# Patient Record
Sex: FEMALE | Race: WHITE | NOT HISPANIC OR LATINO | Employment: FULL TIME | URBAN - METROPOLITAN AREA
[De-identification: names, ages, dates, MRNs, and addresses within clinical notes are randomized per-mention and may not be internally consistent; named-entity substitution may affect disease eponyms.]

---

## 2017-01-18 ENCOUNTER — ALLSCRIPTS OFFICE VISIT (OUTPATIENT)
Dept: OTHER | Facility: OTHER | Age: 32
End: 2017-01-18

## 2017-02-08 ENCOUNTER — GENERIC CONVERSION - ENCOUNTER (OUTPATIENT)
Dept: OTHER | Facility: OTHER | Age: 32
End: 2017-02-08

## 2017-06-01 ENCOUNTER — GENERIC CONVERSION - ENCOUNTER (OUTPATIENT)
Dept: OTHER | Facility: OTHER | Age: 32
End: 2017-06-01

## 2017-07-17 ENCOUNTER — ALLSCRIPTS OFFICE VISIT (OUTPATIENT)
Dept: OTHER | Facility: OTHER | Age: 32
End: 2017-07-17

## 2017-07-17 DIAGNOSIS — R50.9 FEVER: ICD-10-CM

## 2017-07-17 DIAGNOSIS — R10.9 ABDOMINAL PAIN: ICD-10-CM

## 2017-07-17 DIAGNOSIS — K59.00 CONSTIPATION: ICD-10-CM

## 2017-08-15 ENCOUNTER — LAB CONVERSION - ENCOUNTER (OUTPATIENT)
Dept: OTHER | Facility: OTHER | Age: 32
End: 2017-08-15

## 2017-08-15 LAB
25(OH)D3 SERPL-MCNC: 48 NG/ML (ref 30–100)
ANCA SCREEN (HISTORICAL): NEGATIVE
ANTI-MYELOPEROXIDASE (HISTORICAL): <1 AI
ANTI-PROTEINASE 3 (HISTORICAL): <1 AI
C-REACTIVE PROTEIN (HISTORICAL): <0.1 MG/DL
LIPASE SERPL-CCNC: 18 U/L (ref 7–60)
S CEREVISIAE AB (IGA) (HISTORICAL): 6.9 U
S CEREVISIAE AB (IGG) (HISTORICAL): 4.1 U
TSH SERPL DL<=0.05 MIU/L-ACNC: 1.38 MIU/L

## 2017-08-17 ENCOUNTER — ALLSCRIPTS OFFICE VISIT (OUTPATIENT)
Dept: OTHER | Facility: OTHER | Age: 32
End: 2017-08-17

## 2017-08-30 ENCOUNTER — GENERIC CONVERSION - ENCOUNTER (OUTPATIENT)
Dept: OTHER | Facility: OTHER | Age: 32
End: 2017-08-30

## 2017-10-10 ENCOUNTER — GENERIC CONVERSION - ENCOUNTER (OUTPATIENT)
Dept: OTHER | Facility: OTHER | Age: 32
End: 2017-10-10

## 2018-01-11 NOTE — PROGRESS NOTES
Assessment    1  Tear of left rotator cuff (840 4) (M75 102)   2  Back pain, chronic (724 5,338 29) (M54 9,G89 29)   3  Family history of Ovarian cancer : Mother, Grandmother   4  Family history of malignant neoplasm of breast (V16 3) (Z80 3) : Grandmother   5  Tobacco abuse (305 1) (Z72 0)    Plan  Tear of left rotator cuff    · 3 - Akbar Lugo DO  (Orthopedic Surgery) Physician Referral  Consult  Status: Hold  For - Scheduling  Requested for: [id number]  are Referring to a non- Preferred Provider : Established Patient  Care Summary provided  : Yes    Discussion/Summary  Discussion Summary:   28 y/o woman with:  1  Left rotator cuff tear: Patient to get surgery next week from Ortho  Patient is a low cardiopulmonary risk for an intermediate risk surgery, and she has good functional capacity  As such, she may proceed to the OR with acceptable risk and without additional testing  2  Low back pain: Discussed supportive care and return parameters  Patient to continue Meloxicam   3  Tobacco abuse: Encouraged cessation, patient declines help  4  h/o Breast and Ovarian CA: Follow up with her GYN, but consider referral in the future to genetic counsellor  Follow-up in 1 month  Counseling Documentation With Imm: total time of encounter was 30 minutes and 20 minutes was spent counseling  Chief Complaint  Chief Complaint Free Text Note Form: NEW PT TO OUR OFFICE:   PT HAVING LT SHOULDER SURGERY FOR ROTATOR CUFF TEAR ALONG WITH TENDON/LIGAMENT TEAR AND SHAVING OF CLAVICLE ON 1/25/16 BY DR [place] @ 5003  Abby  History of Present Illness  HPI: Patient is a 28 y/o woman who presents to establish care at this office  Patient has a h/o left rotator cuff tear and is planned for surgery  Patient admits a h/o tobacco abuse and low back pain, but denies other acute complaints  Patient denies h/o cardiopulmonary disease and admits that she has excellent functional capacity   Patient admits extensive family h/o ovarian and breast CA and has not been genetically tested  Review of Systems  Complete-Female:   Constitutional: No fever, no chills, feels well, no tiredness, no recent weight gain or weight loss  Eyes: No complaints of eye pain, no red eyes, no eyesight problems, no discharge, no dry eyes, no itching of eyes  ENT: no complaints of earache, no loss of hearing, no nose bleeds, no nasal discharge, no sore throat, no hoarseness  Cardiovascular: No complaints of slow heart rate, no fast heart rate, no chest pain, no palpitations, no leg claudication, no lower extremity edema  Respiratory: No complaints of shortness of breath, no wheezing, no cough, no SOB on exertion, no orthopnea, no PND  Gastrointestinal: No complaints of abdominal pain, no constipation, no nausea or vomiting, no diarrhea, no bloody stools  Genitourinary: No complaints of dysuria, no incontinence, no pelvic pain, no dysmenorrhea, no vaginal discharge or bleeding  Musculoskeletal: arthralgias and myalgias, but as noted in HPI  Integumentary: No complaints of skin rash or lesions, no itching, no skin wounds, no breast pain or lump  Neurological: No complaints of headache, no confusion, no convulsions, no numbness, no dizziness or fainting, no tingling, no limb weakness, no difficulty walking  Psychiatric: Not suicidal, no sleep disturbance, no anxiety or depression, no change in personality, no emotional problems  Endocrine: No complaints of proptosis, no hot flashes, no muscle weakness, no deepening of the voice, no feelings of weakness  Hematologic/Lymphatic: No complaints of swollen glands, no swollen glands in the neck, does not bleed easily, does not bruise easily  Active Problems    1  Abdominal pain (789 00) (R10 9)   2  Allergic drug reaction (995 29,E947 9) (T78 40XA)   3  Back pain, chronic (724 5,338 29) (M54 9,G89 29)   4  History of pregnancy (V13 29)   5  Lower back pain (724 2) (M54 5)   6   Post-op pain (338 18) (G89 18)    Past Medical History  Active Problems And Past Medical History Reviewed: The active problems and past medical history were reviewed and updated today  Surgical History    1  History of  Section   2  History of Hysterectomy   3  History of Tubal Ligation  Surgical History Reviewed: The surgical history was reviewed and updated today  Family History    1  Family history of Ovarian cancer    2  Family history of Cancer   3  Family history of    4  Family history of alcoholism (V17 0) (Z81 1)   5  Family history of hepatic cirrhosis (V18 59) (Z83 79)   6  Family history of Throat cancer    7  Family history of malignant neoplasm of breast (V16 3) (Z80 3)   8  Family history of Ovarian cancer    9  Family history of diabetes mellitus (V18 0) (Z83 3)  Family History Reviewed: The family history was reviewed and updated today  Social History    · Current every day smoker (305 1) (F17 200)   · Current every day smoker (305 1) (F17 200)   ·    ·    · No alcohol use   · No alcohol use   · Two children  Social History Reviewed: The social history was reviewed and updated today  The social history was reviewed and is unchanged  Current Meds   1  Meloxicam 7 5 MG Oral Tablet; TAKE 1 TABLET Twice daily with food; Therapy: 23JHE0861 to (Evaluate:53Sst5153)  Requested for: 37FGU1241; Last   Rx:33Kne5649 Ordered    Allergies    1  No Known Drug Allergies    Vitals  Vital Signs [Data Includes: Current Encounter]    Recorded: 55GRM3738 11:15AM   Blood Pressure 70 mm Hg 120 mm Hg   Height 5 ft 3 in    Weight 134 lb 4 oz    BMI Calculated 23 78 kg/m2    BSA Calculated 1 63 m2      Physical Exam    Constitutional   General appearance: No acute distress, well appearing and well nourished  Eyes   Conjunctiva and lids: No swelling, erythema or discharge  Pupils and irises: Equal, round and reactive to light      Ears, Nose, Mouth, and Throat External inspection of ears and nose: Normal     Otoscopic examination: Tympanic membranes translucent with normal light reflex  Canals patent without erythema  Oropharynx: Normal with no erythema, edema, exudate or lesions  Pulmonary   Respiratory effort: No increased work of breathing or signs of respiratory distress  Auscultation of lungs: Clear to auscultation  Cardiovascular   Auscultation of heart: Normal rate and rhythm, normal S1 and S2, without murmurs  Examination of extremities for edema and/or varicosities: Normal     Abdomen   Abdomen: Abnormal   Abdomen soft, non-distended  Scant epigastric and suprapubic tenderness, no rebound or guarding  Liver and spleen: No hepatomegaly or splenomegaly  Lymphatic   Palpation of lymph nodes in neck: No lymphadenopathy  Musculoskeletal   Gait and station: Normal     Digits and nails: Normal without clubbing or cyanosis  Skin   Skin and subcutaneous tissue: Normal without rashes or lesions  Neurologic   Cranial nerves: Cranial nerves 2-12 intact  Sensation: No sensory loss      Psychiatric   Orientation to person, place, and time: Normal     Mood and affect: Normal          Signatures   Electronically signed by : TREVOR Horton ; Jan 22 2016 12:06PM EST                       (Author)

## 2018-01-12 VITALS
HEIGHT: 63 IN | SYSTOLIC BLOOD PRESSURE: 110 MMHG | BODY MASS INDEX: 22.32 KG/M2 | DIASTOLIC BLOOD PRESSURE: 64 MMHG | WEIGHT: 126 LBS

## 2018-01-13 VITALS
DIASTOLIC BLOOD PRESSURE: 68 MMHG | HEIGHT: 63 IN | WEIGHT: 123.5 LBS | BODY MASS INDEX: 21.88 KG/M2 | SYSTOLIC BLOOD PRESSURE: 118 MMHG

## 2018-01-13 VITALS
TEMPERATURE: 98.9 F | HEIGHT: 63 IN | DIASTOLIC BLOOD PRESSURE: 52 MMHG | HEART RATE: 101 BPM | WEIGHT: 127.38 LBS | SYSTOLIC BLOOD PRESSURE: 104 MMHG | OXYGEN SATURATION: 99 % | BODY MASS INDEX: 22.57 KG/M2

## 2018-01-15 NOTE — MISCELLANEOUS
Message  GI Reminder Recall Marton Halsted:   Date: 06/01/2017   Dear Muriel Wilkerson:     Review of our records shows you are due for the following: Follow Up Visit  Please call the following office to schedule your appointment:   [address], [address], [place], [place], [address] [facility] [phone]  We look forward to hearing from you!      Sincerely,     [facility]      Signatures   Electronically signed by : Angie Reynolds, ; Jun 1 2017  5:55PM EST                       (Author)

## 2018-01-15 NOTE — MISCELLANEOUS
Message   Recorded as Task   Date: 12/19/2016 11:54 AM, Created By: Marcos Wolfe   Task Name: Care Coordination   Assigned To: Ary Cochran   Regarding Patient: Jordan Livingston, Status: Active   CommentLindenis Luzma - 19 Dec 2016 11:54 AM     TASK CREATED  Patient unable to continue to wait  Patient is requesting you call her with the results or send the information to her PCP regarding follow  Patient does not wish to drive back to [facility]  Ary Cochran - 19 Dec 2016 5:07 PM     TASK EDITED  I reviewed 12/12/16 C-spine MRI with Dr Georgie Irby -- patient has borderline cerebellar tonsilar ectopia  (Dr Georgie Irby reports this does not necessarily meet Chiari Malformation criteria)  There is normal flow of CSF anterior and posterior to the foramen magnum  No syrinx  As discussed with Dr Georgie Irby neurosurgical intervention is not indicated and no further routine neurosurgical follow up necessary  Patient may follow up with our office on an as needed basis from a neurosurgical standpoint  Recommend patient refrain from manipulation craniocervical junction or activity that could result in whiplash of head/neck  Recommend patient contact our office with neuro changes  I called patient at [phone] and left message on patient's voice mail for patient to call back so can discuss with patient  Danyell Leos - 20 Dec 2016 11:12 AM     TASK EDITED  PT CALLED YOU BACK  PLEASE CALL PT  Kay  - 20 Dec 2016 12:49 PM     TASK EDITED  I called patient (phone# [phone]) and left message on voice mail requesting call back  Ary Cochran - 20 Dec 2016 1:15 PM     TASK EDITED  Patient called back and I spoke with her  I reviewed results of C-spine MRI with patient  I explained to patient that I had reviewed her C-spine MRI with Dr Georgie Irby  and she has borderline cerebellar tonsillar ectopia (does not necessarily meet Chiari Malformation criteria)    I explained to patient there is normal CSF flow anterior / posterior to foramen magnum and no syrinx  I explained to patient as I  discussed with Dr Cheryl Mahajan that neurosurgical intervention is not indicated and no further routine neurosurgical follow up necessary  Patient is released to follow up with our office on an as needed basis  Patient advised to refrain from manipulation of craniocervical junction or activity that could result in whiplash of head/neck  Advised patient to contact our office with neurological change such as weakness, sensory change, motor change, difficulty with coordination, gait dysfunction, or other neurological change  Patient expressed understanding and agreement          Signatures   Electronically signed by : Iman Cortez, [facility]; Dec 20 2016  1:16PM EST                       (Author)

## 2018-01-18 NOTE — MISCELLANEOUS
Message  GI Reminder Recall Hailey Vernon:   Date: 12/29/2016   Dear Ryanne Duran:     Review of our records shows you are due for the following: Follow Up Visit  Please call the following office to schedule your appointment:   [address] [place], [address], [address] [facility] [phone]  We look forward to hearing from you! Sincerely,         Signatures  [facility]    Electronically signed by :  Marce King, ; Dec 29 2016  4:24PM EST                       (Author)

## 2018-01-22 VITALS
BODY MASS INDEX: 23.04 KG/M2 | SYSTOLIC BLOOD PRESSURE: 100 MMHG | WEIGHT: 130 LBS | HEIGHT: 63 IN | TEMPERATURE: 98.7 F | DIASTOLIC BLOOD PRESSURE: 64 MMHG

## 2018-01-22 VITALS — WEIGHT: 128 LBS | SYSTOLIC BLOOD PRESSURE: 120 MMHG | BODY MASS INDEX: 22.67 KG/M2 | DIASTOLIC BLOOD PRESSURE: 66 MMHG

## 2018-03-12 RX ORDER — ACETAMINOPHEN, ASPIRIN AND CAFFEINE 250; 250; 65 MG/1; MG/1; MG/1
TABLET, FILM COATED ORAL
COMMUNITY
End: 2018-04-20

## 2018-03-13 ENCOUNTER — OFFICE VISIT (OUTPATIENT)
Dept: FAMILY MEDICINE CLINIC | Facility: CLINIC | Age: 33
End: 2018-03-13
Payer: COMMERCIAL

## 2018-03-13 VITALS
BODY MASS INDEX: 31.98 KG/M2 | HEIGHT: 55 IN | WEIGHT: 138.2 LBS | SYSTOLIC BLOOD PRESSURE: 100 MMHG | DIASTOLIC BLOOD PRESSURE: 60 MMHG

## 2018-03-13 DIAGNOSIS — G43.109 MIGRAINE WITH AURA AND WITHOUT STATUS MIGRAINOSUS, NOT INTRACTABLE: Primary | ICD-10-CM

## 2018-03-13 DIAGNOSIS — G56.02 LEFT CARPAL TUNNEL SYNDROME: ICD-10-CM

## 2018-03-13 DIAGNOSIS — R55 VASOVAGAL SYNCOPE: ICD-10-CM

## 2018-03-13 DIAGNOSIS — G93.5 CHIARI MALFORMATION TYPE I (HCC): ICD-10-CM

## 2018-03-13 DIAGNOSIS — F41.9 ANXIETY: ICD-10-CM

## 2018-03-13 PROCEDURE — 99214 OFFICE O/P EST MOD 30 MIN: CPT | Performed by: FAMILY MEDICINE

## 2018-03-13 RX ORDER — TOPIRAMATE 25 MG/1
25 CAPSULE, COATED PELLETS ORAL 2 TIMES DAILY
Qty: 60 CAPSULE | Refills: 3 | Status: SHIPPED | OUTPATIENT
Start: 2018-03-13 | End: 2018-05-10 | Stop reason: SDUPTHER

## 2018-03-13 NOTE — PROGRESS NOTES
Assessment/Plan:   at patient will see Orthopedics regarding carpal tunnel syndrome and left wrist and elbow pain  Patient will have MRI of the brain and laboratory studies regarding carry malformation and ongoing migraines which are worsening  Patient will start Topamax nightly  No problem-specific Assessment & Plan notes found for this encounter  Diagnoses and all orders for this visit:    Migraine with aura and without status migrainosus, not intractable  -     CBC and differential; Future  -     Comprehensive metabolic panel; Future  -     HEMOGLOBIN A1C W/ EAG ESTIMATION; Future  -     Lipid panel; Future  -     Microalbumin / creatinine urine ratio  -     TSH, 3rd generation with T4 reflex; Future  -     Magnesium; Future  -     C-reactive protein; Future  -     Lipid panel; Future  -     Vitamin B12; Future  -     MRI brain w wo contrast; Future  -     topiramate (TOPAMAX) 25 mg sprinkle capsule; Take 1 capsule (25 mg total) by mouth 2 (two) times a day    Vasovagal syncope  -     CBC and differential; Future  -     Comprehensive metabolic panel; Future  -     HEMOGLOBIN A1C W/ EAG ESTIMATION; Future  -     Lipid panel; Future  -     Microalbumin / creatinine urine ratio  -     TSH, 3rd generation with T4 reflex; Future  -     Magnesium; Future  -     C-reactive protein; Future  -     Lipid panel; Future  -     Vitamin B12; Future    Left carpal tunnel syndrome  -     Ambulatory referral to Orthopedic Surgery; Future    Chiari malformation type I ([facility] [address] )  -     CBC and differential; Future  -     Comprehensive metabolic panel; Future  -     HEMOGLOBIN A1C W/ EAG ESTIMATION; Future  -     Lipid panel; Future  -     Microalbumin / creatinine urine ratio  -     TSH, 3rd generation with T4 reflex; Future  -     Magnesium; Future  -     C-reactive protein; Future  -     Lipid panel;  Future  -     Vitamin B12; Future  -     MRI brain w wo contrast; Future    Anxiety    Other orders  - aspirin-acetaminophen-caffeine (EXCEDRIN MIGRAINE) [phone] MG per tablet; Take by mouth  -     pentosan polysulfate (ELMIRON) 100 mg capsule; Take by mouth          Subjective:      Patient ID: Yvonne Edgar is a 28 y o  female  Patient is here with worsening headaches over the past few weeks  Patient with history of migraine headaches for decades  Patient does use Excedrin  The patient with left elbow and wrist pain and history of carpal tunnel syndrome on the left  Patient also with history of left shoulder reconstructive surgery and pain associated with this  Patient is also had near-syncope and syncopal episodes  Normal urination and defecation  No chest pain or shortness of breath  Migraine    Associated symptoms include dizziness and numbness  Pertinent negatives include no seizures  The following portions of the patient's history were reviewed and updated as appropriate: allergies, current medications, past family history, past medical history, past social history, past surgical history and problem list     Review of Systems   Constitutional: Negative  HENT: Negative  Eyes: Negative  Respiratory: Negative  Cardiovascular: Negative  Gastrointestinal: Negative  Endocrine: Negative  Genitourinary: Negative  Musculoskeletal: Positive for arthralgias  Skin: Negative  Allergic/Immunologic: Negative  Neurological: Positive for dizziness, syncope, numbness and headaches  Negative for seizures  Hematological: Negative  Psychiatric/Behavioral: The patient is nervous/anxious  Objective:      /60 (BP Location: Left arm, Patient Position: Sitting, Cuff Size: Standard)   Ht 1' 7 2" (0 488 m)   Wt 62 7 kg (138 lb 3 2 oz)    58 kg/m²          Physical Exam   Constitutional: She is oriented to person, place, and time  She appears well-developed and well-nourished  No distress  HENT:   Head: Normocephalic     Right Ear: External ear normal  Left Ear: External ear normal    Mouth/Throat: Oropharynx is clear and moist  No oropharyngeal exudate  Eyes: EOM are normal  Pupils are equal, round, and reactive to light  Right eye exhibits no discharge  Left eye exhibits no discharge  No scleral icterus  Neck: Normal range of motion  Neck supple  No thyromegaly present  Cardiovascular: Normal rate, regular rhythm, normal heart sounds and intact distal pulses  Exam reveals no gallop and no friction rub  No murmur heard  Pulmonary/Chest: Effort normal and breath sounds normal  No respiratory distress  She has no wheezes  She has no rales  She exhibits no tenderness  Abdominal: Soft  Bowel sounds are normal  She exhibits no distension  There is no tenderness  There is no rebound and no guarding  Musculoskeletal: Normal range of motion  She exhibits no edema or tenderness  Lymphadenopathy:     She has no cervical adenopathy  Neurological: She is alert and oriented to person, place, and time  No cranial nerve deficit  She exhibits normal muscle tone  Coordination normal    Skin: Skin is warm and dry  No rash noted  She is not diaphoretic  No erythema  No pallor  Psychiatric: She has a normal mood and affect  Her behavior is normal  Judgment and thought content normal    Nursing note and vitals reviewed

## 2018-03-21 ENCOUNTER — HOSPITAL ENCOUNTER (OUTPATIENT)
Dept: MRI IMAGING | Facility: HOSPITAL | Age: 33
Discharge: HOME | End: 2018-03-21
Payer: COMMERCIAL

## 2018-03-21 DIAGNOSIS — G93.5 CHIARI MALFORMATION TYPE I (HCC): ICD-10-CM

## 2018-03-21 DIAGNOSIS — G43.109 MIGRAINE WITH AURA AND WITHOUT STATUS MIGRAINOSUS, NOT INTRACTABLE: ICD-10-CM

## 2018-03-21 PROCEDURE — A9585 GADOBUTROL INJECTION: HCPCS | Performed by: FAMILY MEDICINE

## 2018-03-21 PROCEDURE — 70553 MRI BRAIN STEM W/O & W/DYE: CPT

## 2018-03-21 RX ADMIN — GADOBUTROL 6 ML: 604.72 INJECTION INTRAVENOUS at 07:42

## 2018-03-23 ENCOUNTER — TELEPHONE (OUTPATIENT)
Dept: FAMILY MEDICINE CLINIC | Facility: CLINIC | Age: 33
End: 2018-03-23

## 2018-03-23 NOTE — TELEPHONE ENCOUNTER
Please call patient  MRI of the brain showed stable, minimal Chiari malformation which very well may not be causing her any symptoms (generally it is congenital)  Overall the MRI did not show any other lesions, signs of stroke, masses, etc   Patient may discuss more fully with Dr Roopa Preciado at her follow-up visit

## 2018-04-20 ENCOUNTER — OFFICE VISIT (OUTPATIENT)
Dept: OCCUPATIONAL THERAPY | Facility: HOSPITAL | Age: 33
End: 2018-04-20
Attending: ORTHOPAEDIC SURGERY
Payer: COMMERCIAL

## 2018-04-20 ENCOUNTER — OFFICE VISIT (OUTPATIENT)
Dept: OBGYN CLINIC | Facility: HOSPITAL | Age: 33
End: 2018-04-20
Payer: COMMERCIAL

## 2018-04-20 VITALS
HEART RATE: 92 BPM | HEIGHT: 62 IN | BODY MASS INDEX: 23.85 KG/M2 | SYSTOLIC BLOOD PRESSURE: 104 MMHG | WEIGHT: 129.6 LBS | DIASTOLIC BLOOD PRESSURE: 69 MMHG

## 2018-04-20 DIAGNOSIS — G56.23 CUBITAL TUNNEL SYNDROME OF BOTH UPPER EXTREMITIES: Primary | ICD-10-CM

## 2018-04-20 DIAGNOSIS — G56.02 LEFT CARPAL TUNNEL SYNDROME: ICD-10-CM

## 2018-04-20 DIAGNOSIS — G56.23 CUBITAL TUNNEL SYNDROME OF BOTH UPPER EXTREMITIES: ICD-10-CM

## 2018-04-20 PROCEDURE — 99203 OFFICE O/P NEW LOW 30 MIN: CPT | Performed by: ORTHOPAEDIC SURGERY

## 2018-04-20 PROCEDURE — G8992 OTHER PT/OT  D/C STATUS: HCPCS | Performed by: OCCUPATIONAL THERAPIST

## 2018-04-20 PROCEDURE — L3702 EO W/O JOINTS CF: HCPCS | Performed by: OCCUPATIONAL THERAPIST

## 2018-04-20 PROCEDURE — G8990 OTHER PT/OT CURRENT STATUS: HCPCS | Performed by: OCCUPATIONAL THERAPIST

## 2018-04-20 PROCEDURE — G8991 OTHER PT/OT GOAL STATUS: HCPCS | Performed by: OCCUPATIONAL THERAPIST

## 2018-04-20 RX ORDER — TRAMADOL HYDROCHLORIDE 50 MG/1
TABLET ORAL
COMMUNITY
Start: 2018-04-08 | End: 2018-04-20

## 2018-04-20 RX ORDER — CYCLOBENZAPRINE HCL 10 MG
TABLET ORAL
COMMUNITY
Start: 2018-04-08 | End: 2018-04-20

## 2018-04-20 NOTE — PROGRESS NOTES
Splint     Diagnosis:   1  Cubital tunnel syndrome of both upper extremities  Ambulatory referral to PT/OT hand therapy     Indication: nerve    Location: B  Supplies: Skin coverage/barrier  Splint type: long arm   Wearing Schedule: Night only  Describe Position: 70     Precautions: Universal (skin contact/breakdown)    Patient or Caregiver expresses understanding of wearing Schedule and Precautions? Yes  Patient or Caregiver able to don/doff orthotic independently? Yes    Written orders provided to patient?  Yes  Orders Obtained: Written  Orders Obtained from: Dr Sincere Rahman

## 2018-04-21 NOTE — PROGRESS NOTES
ASSESSMENT/PLAN:    Diagnoses and all orders for this visit:    Cubital tunnel syndrome of both upper extremities  -     EMG 2 Limb Upper Extremity; Future  -     Ambulatory referral to PT/OT hand therapy; Future    Left carpal tunnel syndrome  -     Ambulatory referral to Orthopedic Surgery    Other orders  -     Discontinue: cyclobenzaprine (FLEXERIL) 10 mg tablet;   -     Discontinue: traMADol (ULTRAM) 50 mg tablet; Assessment:   Bilateral cubital tunnel syndrome and asymptomatic carpal tunnel syndrome    Plan: At this point, patient has cubital tunnel syndrome appears to be most symptomatic  Patient will obtain EMG is of the bilateral upper extremities, nocturnal splinting from therapy as well as nerve gliding exercises  Follow up in 4-6 weeks for repeat clinical evaluation  Patient may require surgical intervention in the form of cubital tunnel releases bilaterally  Follow Up: To Do Next Visit:  Review EMG    General Discussions:     Carpal Tunnel Syndrome: The anatomy and physiology of carpal tunnel syndrome was discussed with the patient today  Increase pressure localized under the transverse carpal ligament can cause pain, numbness, tingling, or dysesthesias within the median nerve distribution as well as feelings of fatigue, clumsiness, or awkwardness  These symptoms typically occur at night and worse in the morning upon waking  Eventually, untreated carpal tunnel syndrome can result in weakness and permanent loss of muscle within the thenar compartment of the hand  Treatment options were discussed with the patient  Conservative treatment includes nocturnal resting splints to keep the nerve in a neutral position, ergonomic changes within the work or home environment, activity modification, and tendon gliding exercises  Steroid injections within the carpal canal can help a majority of patients, however this is often self-limited in a majority of patients    Surgical intervention to divide the transverse carpal ligament typically results in a long-lasting relief of the patient's complaints, with the recurrence rate of less than 1%  Cubital Tunnel Syndrome: The anatomy and physiology of cubital tunnel syndrome were discussed with the patient today in the office  Typically, increased elbow flexion activities decrease blood flow within the intraneural spaces, resulting in a feeling of numbness, tingling, weakness, or clumsiness within the hand and fingers  Occasionally, anatomic structures such as medial elbow osteophytes, the medial head of the triceps, were subluxing ulnar nerve may result in increased pressure or aggravation at the cubital tunnel  Typical signs and symptoms usually include numbness and tingling within the ring and small finger, weakness with , and weakness with pinch  Conservative treatment and includes nocturnal bracing to keep the elbow in a semi-extended position, activity modification, therapy, and avoiding excessive elbow flexion activities  A majority of patients typically respond to conservative treatment over a period of approximately 3-6 months  EMG/NCV testing of the ulnar nerve at the elbow is not as reliable as carpal tunnel syndrome  Surgical intervention in the form of in situ release of the ulnar nerve at the elbow or ulnar nerve transposition may be required in up to 20% of patients  Operative Discussions:         _____________________________________________________  CHIEF COMPLAINT:  Chief Complaint   Patient presents with    Right Hand - Pain, Numbness, Swelling    Left Hand - Pain, Numbness, Swelling         SUBJECTIVE:  Negra Gamez is a 28y o  year old female who presents for evaluation bilateral hand discomfort    She reports that with elbow flexion activities including at night she sleeps, she awakens with medial sided elbow pain left greater than right that radiates down the ulnar side of the hand causing numbness and tingling into the ring and small fingers  Mild-to-moderate discomfort noted  No fevers, chills, constitutional symptoms or previous injuries  PAST MEDICAL HISTORY:  Past Medical History:   Diagnosis Date    Abdominal pain     Back pain, chronic     Constipation     Fatigue     Loss of weight     Nausea with vomiting        PAST SURGICAL HISTORY:  Past Surgical History:   Procedure Laterality Date     SECTION      COLONOSCOPY N/A 2016    Procedure: COLONOSCOPY;  Surgeon: Apolonia Coates MD;  Location: [facility]; Service:     ENDOMETRIAL CRYOABLATION      HERNIA REPAIR      HYSTERECTOMY      SHOULDER SURGERY Left     TUBAL LIGATION         FAMILY HISTORY:  Family History   Problem Relation Age of Onset    No Known Problems Mother     No Known Problems Father        SOCIAL HISTORY:  Social History   Substance Use Topics    Smoking status: Current Every Day Smoker     Packs/day: 0 50     Types: Cigarettes    Smokeless tobacco: Never Used    Alcohol use No       MEDICATIONS:    Current Outpatient Prescriptions:     topiramate (TOPAMAX) 25 mg sprinkle capsule, Take 1 capsule (25 mg total) by mouth 2 (two) times a day, Disp: 60 capsule, Rfl: 3    ALLERGIES:  No Known Allergies    REVIEW OF SYSTEMS:  Pertinent items are noted in HPI  A comprehensive review of systems was negative      LABS:  HgA1c: No results found for: HGBA1C  BMP:   Lab Results   Component Value Date    GLUCOSE 88 10/19/2016    CALCIUM 9 6 2017     2017    K 4 5 2017    CO2 27 2017     2017    BUN 14 2017    CREATININE 0 78 2017       _____________________________________________________  PHYSICAL EXAMINATION:  General: well developed and well nourished, alert, oriented times 3 and appears comfortable  Psychiatric: Normal  HEENT: Trachea Midline, No torticollis  Cardiovascular: No discernable arrhythmia  Pulmonary: No wheezing or stridor  Skin: No masses, erthema, lacerations, fluctation, ulcerations  Neurovascular: Sensation Intact to the Median, Ulnar, Radial Nerve, Motor Intact to the Median, Ulnar, Radial Nerve, Pulses Intact and Positive Tinel's over the bilateral elbows, positive elbow flexion compression test   Weakness intrinsics notice 4+/5  Negative Tinel's over the carpal tunnel with negative adductor pollicis brevis weakness  No evidence of atrophy  Negative Wartenberg sign and no evidence of clawing  MUSCULOSKELETAL EXAMINATION:  Extremities:  Full elbow wrist and finger range of motion   Negative cervical spine examination      _____________________________________________________  STUDIES REVIEWED:  No Studies to review      PROCEDURES PERFORMED:  Procedures  No Procedures performed today

## 2018-05-10 DIAGNOSIS — G43.109 MIGRAINE WITH AURA AND WITHOUT STATUS MIGRAINOSUS, NOT INTRACTABLE: ICD-10-CM

## 2018-05-10 RX ORDER — TOPIRAMATE 25 MG/1
25 CAPSULE, COATED PELLETS ORAL 2 TIMES DAILY
Qty: 180 CAPSULE | Refills: 0 | Status: SHIPPED | OUTPATIENT
Start: 2018-05-10 | End: 2018-08-02 | Stop reason: ALTCHOICE

## 2018-05-10 NOTE — TELEPHONE ENCOUNTER
Patient called today for refill of Topamax 25 mg sprinkle capsule   Pharmacy [facility] [address] [facility]

## 2018-06-08 ENCOUNTER — HOSPITAL ENCOUNTER (OUTPATIENT)
Dept: NEUROLOGY | Facility: CLINIC | Age: 33
Discharge: HOME | End: 2018-06-08
Payer: COMMERCIAL

## 2018-06-08 DIAGNOSIS — G56.23 CUBITAL TUNNEL SYNDROME OF BOTH UPPER EXTREMITIES: ICD-10-CM

## 2018-06-08 PROCEDURE — 95912 NRV CNDJ TEST 11-12 STUDIES: CPT | Performed by: PSYCHIATRY & NEUROLOGY

## 2018-06-08 PROCEDURE — 95885 MUSC TST DONE W/NERV TST LIM: CPT | Performed by: PSYCHIATRY & NEUROLOGY

## 2018-06-12 ENCOUNTER — TELEPHONE (OUTPATIENT)
Dept: OBGYN CLINIC | Facility: HOSPITAL | Age: 33
End: 2018-06-12

## 2018-06-12 NOTE — TELEPHONE ENCOUNTER
Pt has an appt at the end of July but would like someone to call her before then about her emg results

## 2018-06-19 NOTE — TELEPHONE ENCOUNTER
EMG was normal  However, with the disorder at the elbow (cubital tunnel) this sometimes does not show up unless it's on the more severe side  So we know the likelihood of this being severe is low  We will reevaluate her exam at her follow up to see if still consistent with cubital tunnel and assess how her therapy/splinting has been working for her

## 2018-07-25 ENCOUNTER — OFFICE VISIT (OUTPATIENT)
Dept: OBGYN CLINIC | Facility: HOSPITAL | Age: 33
End: 2018-07-25
Payer: COMMERCIAL

## 2018-07-25 VITALS
WEIGHT: 131 LBS | SYSTOLIC BLOOD PRESSURE: 110 MMHG | HEART RATE: 85 BPM | HEIGHT: 62 IN | DIASTOLIC BLOOD PRESSURE: 74 MMHG | BODY MASS INDEX: 24.11 KG/M2

## 2018-07-25 DIAGNOSIS — G56.23 CUBITAL TUNNEL SYNDROME OF BOTH UPPER EXTREMITIES: Primary | ICD-10-CM

## 2018-07-25 PROCEDURE — 99214 OFFICE O/P EST MOD 30 MIN: CPT | Performed by: ORTHOPAEDIC SURGERY

## 2018-07-25 NOTE — H&P
ASSESSMENT/PLAN:    Assessment:   Cubital Tunnel Syndrome  bilateral    Plan:   Cubital Tunnel Release  Left with a 95% success rate  Post op restrictions were discussed with the patient today in regards to her job description as a   When her left side is healed and ready she will proceed with her right side  Follow Up: After Surgery    To Do Next Visit:    and Sutures out    General Discussions:       Operative Discussions:  Cubital Tunnel Release: The anatomy and physiology of cubital tunnel syndrome were discussed with the patient today in the office  Typically, increased elbow flexion activities decrease blood flow within the intraneural spaces, resulting in a feeling of numbness, tingling, weakness, or clumsiness within the hand and fingers  Occasionally, anatomic structures such as medial elbow osteophytes, the medial head of the triceps, were subluxing ulnar nerve may result in increased pressure or aggravation at the cubital tunnel  Typical signs and symptoms usually include numbness and tingling within the ring and small finger, weakness with , and weakness with pinch  Conservative treatment and includes nocturnal bracing to keep the elbow in a semi-extended position, activity modification, therapy, and avoiding excessive elbow flexion activities  A majority of patients typically respond to conservative treatment over a period of approximately 3-6 months  EMG/NCV testing of the ulnar nerve at the elbow is not as reliable as carpal tunnel syndrome  Surgical intervention in the form of in situ release of the ulnar nerve at the elbow or ulnar nerve transposition may be required in up to 20% of patients  The patient has elected to undergo an cubital tunnel release  The possibility of converting to an open procedure, or a subcutaneous or submuscular ulnar nerve transposition depending on the nerve stability was discussed with the patient    Typically, in the postoperative period, light activities are allowed immediately, driving is allowed when narcotic medications have stopped, and the incision may get wet after 5 days  Heavy activities will be allowed after follow up appointment in 1-2 weeks  Anti-inflammatory medications should be held for approximately 5 days postoperative  While the pain within the ring and small finger of the hands generally improves rapidly, the numbness and tingling, as well as the strength, will slowly improve over a period of weeks to months  Total recovery can take up to 18 months from the time of surgery  Numbness and tingling near the incision, or near the medial aspect of the forearm was discussed with the patient  The patient has an understanding of the above mentioned discussion  The risks and benefits of the procedure were explained to the patient, which include, but are not limited to: Bleeding, infection, recurrence, pain, scar, damage to tendons, damage to nerves, and damage to blood vessels, failure to give desired results and complications related to anesthesia   These risks, along with alternative conservative treatment options, and postoperative protocols were voiced back and understood by the patient   All questions were answered to the patient's satisfaction   The patient agrees to comply with a standard postoperative protocol, and is willing to proceed   Education was provided via written and auditory forms   There were no barriers to learning   Written handouts regarding wound care, incision and scar care, and general preoperative information was provided to the patient   Prior to surgery, the patient may be requested to stop all anti-inflammatory medications   Prophylactic aspirin, Plavix, and Coumadin may be allowed to be continued   Medications including vitamin E , ginkgo, and fish oil are requested to be stopped approximately one week prior to surgery   Hypertensive medications and beta blockers, if taken, should be continued  _____________________________________________________  CHIEF COMPLAINT:  Chief Complaint   Patient presents with    Left Hand - Follow-up    Right Hand - Follow-up         SUBJECTIVE:  Isabel Alejo is a 35y o  year old female who presents for follow up regarding Carpal Tunnel Syndrome  bilateral and Cubital Tunnel Syndrome  bilateral   Since last visit, Isabel Alejo has tried nocturnal elbow bracing and therapy for nerve glide exercises without relief  Pt states that her left side is more severe than her right  Today there is Pain  Mild  Intermittant  Aching and Numbness to the bilateral elbow  Radiation: Yes to the  small finger and with the occasionall numbness to all full fingers  Associated symptoms: No Complaints    PAST MEDICAL HISTORY:  Past Medical History:   Diagnosis Date    Abdominal pain     Abnormal MRI, cervical spine     last assessed: 2016    Back pain, chronic     Constipation     Fatigue     Hemorrhoids     resolved: 2017    Loss of weight     Nausea with vomiting     Tear of left rotator cuff     last assessed: 2016       PAST SURGICAL HISTORY:  Past Surgical History:   Procedure Laterality Date     SECTION      x 2    COLONOSCOPY N/A 2016    Procedure: COLONOSCOPY;  Surgeon: Anabel Ornelas MD;  Location: [facility];   Service:     ENDOMETRIAL CRYOABLATION      HERNIA REPAIR      HYSTERECTOMY      SHOULDER SURGERY Left     TUBAL LIGATION         FAMILY HISTORY:  Family History   Problem Relation Age of Onset    Ovarian cancer Mother     Cancer Father     Alcohol abuse Father         alcoholism    Cirrhosis Father         hepatic    Throat cancer Father     Diabetes Maternal Grandfather         melliltus    Breast cancer Other     Ovarian cancer Other        SOCIAL HISTORY:  Social History   Substance Use Topics    Smoking status: Current Every Day Smoker     Packs/day: 0 50     Types: Cigarettes    Smokeless tobacco: Never Used    Alcohol use No       MEDICATIONS:    Current Outpatient Prescriptions:     topiramate (TOPAMAX) 25 mg sprinkle capsule, Take 1 capsule (25 mg total) by mouth 2 (two) times a day, Disp: 180 capsule, Rfl: 0    ALLERGIES:  No Known Allergies    REVIEW OF SYSTEMS:  Pertinent items are noted in HPI      LABS:  HgA1c: No results found for: HGBA1C  BMP:   Lab Results   Component Value Date    GLUCOSE 88 10/19/2016    CALCIUM 9 6 08/09/2017     08/09/2017    K 4 5 08/09/2017    CO2 27 08/09/2017     08/09/2017    BUN 14 08/09/2017    CREATININE 0 78 08/09/2017           _____________________________________________________  PHYSICAL EXAMINATION:  General: well developed and well nourished, alert, oriented times 3 and appears comfortable  Psychiatric: Normal  HEENT: Trachea Midline, No torticollis  Cardiovascular: No discernable arrhythmia  Pulmonary: No wheezing or stridor  Skin: No masses, erthema, lacerations, fluctation, ulcerations  Neurovascular: Pulses Intact    MUSCULOSKELETAL EXAMINATION:  LEFT SIDE:  Cubital Tunnel:  No ulnar nerve subluxation, Positive Elbow Flexion/Compression Test, Positive Scratch Collapse at the elbow and intrinsic 4-/5, fpl 5/5, fdp to small 4-/5, positive tinels at cubital tunnel  RIGHT SIDE:  Cubital Tunnel:  No ulnar nerve subluxation, Positive Elbow Flexion/Compression Test, Positive Scratch Collapse at the elbow and intrinsic 4/5, fpl 5/5, fdp to small 4-/5, positive tinels at the elbow      _____________________________________________________  STUDIES REVIEWED:  EMG: significant drop in conductive velocity       PROCEDURES PERFORMED:  Procedures  No Procedures performed today   Scribe Attestation    I,:   Humberto Bynum am acting as a scribe while in the presence of the attending physician :        I,:   Alcides Long MD personally performed the services described in this documentation    as scribed in my presence :

## 2018-07-25 NOTE — PROGRESS NOTES
ASSESSMENT/PLAN:    Assessment:   Cubital Tunnel Syndrome  bilateral    Plan:   Cubital Tunnel Release  Left with a 95% success rate  Post op restrictions were discussed with the patient today in regards to her job description as a   When her left side is healed and ready she will proceed with her right side  Follow Up: After Surgery    To Do Next Visit:    and Sutures out    General Discussions:       Operative Discussions:  Cubital Tunnel Release: The anatomy and physiology of cubital tunnel syndrome were discussed with the patient today in the office  Typically, increased elbow flexion activities decrease blood flow within the intraneural spaces, resulting in a feeling of numbness, tingling, weakness, or clumsiness within the hand and fingers  Occasionally, anatomic structures such as medial elbow osteophytes, the medial head of the triceps, were subluxing ulnar nerve may result in increased pressure or aggravation at the cubital tunnel  Typical signs and symptoms usually include numbness and tingling within the ring and small finger, weakness with , and weakness with pinch  Conservative treatment and includes nocturnal bracing to keep the elbow in a semi-extended position, activity modification, therapy, and avoiding excessive elbow flexion activities  A majority of patients typically respond to conservative treatment over a period of approximately 3-6 months  EMG/NCV testing of the ulnar nerve at the elbow is not as reliable as carpal tunnel syndrome  Surgical intervention in the form of in situ release of the ulnar nerve at the elbow or ulnar nerve transposition may be required in up to 20% of patients  The patient has elected to undergo an cubital tunnel release  The possibility of converting to an open procedure, or a subcutaneous or submuscular ulnar nerve transposition depending on the nerve stability was discussed with the patient    Typically, in the postoperative period, light activities are allowed immediately, driving is allowed when narcotic medications have stopped, and the incision may get wet after 5 days  Heavy activities will be allowed after follow up appointment in 1-2 weeks  Anti-inflammatory medications should be held for approximately 5 days postoperative  While the pain within the ring and small finger of the hands generally improves rapidly, the numbness and tingling, as well as the strength, will slowly improve over a period of weeks to months  Total recovery can take up to 18 months from the time of surgery  Numbness and tingling near the incision, or near the medial aspect of the forearm was discussed with the patient  The patient has an understanding of the above mentioned discussion  The risks and benefits of the procedure were explained to the patient, which include, but are not limited to: Bleeding, infection, recurrence, pain, scar, damage to tendons, damage to nerves, and damage to blood vessels, failure to give desired results and complications related to anesthesia   These risks, along with alternative conservative treatment options, and postoperative protocols were voiced back and understood by the patient   All questions were answered to the patient's satisfaction   The patient agrees to comply with a standard postoperative protocol, and is willing to proceed   Education was provided via written and auditory forms   There were no barriers to learning   Written handouts regarding wound care, incision and scar care, and general preoperative information was provided to the patient   Prior to surgery, the patient may be requested to stop all anti-inflammatory medications   Prophylactic aspirin, Plavix, and Coumadin may be allowed to be continued   Medications including vitamin E , ginkgo, and fish oil are requested to be stopped approximately one week prior to surgery   Hypertensive medications and beta blockers, if taken, should be continued  _____________________________________________________  CHIEF COMPLAINT:  Chief Complaint   Patient presents with    Left Hand - Follow-up    Right Hand - Follow-up         SUBJECTIVE:  Shelby Jimenez is a 35y o  year old female who presents for follow up regarding Carpal Tunnel Syndrome  bilateral and Cubital Tunnel Syndrome  bilateral   Since last visit, Shelby Jimenez has tried nocturnal elbow bracing and therapy for nerve glide exercises without relief  Pt states that her left side is more severe than her right  Today there is Pain  Mild  Intermittant  Aching and Numbness to the bilateral elbow  Radiation: Yes to the  small finger and with the occasionall numbness to all full fingers  Associated symptoms: No Complaints    PAST MEDICAL HISTORY:  Past Medical History:   Diagnosis Date    Abdominal pain     Abnormal MRI, cervical spine     last assessed: 2016    Back pain, chronic     Constipation     Fatigue     Hemorrhoids     resolved: 2017    Loss of weight     Nausea with vomiting     Tear of left rotator cuff     last assessed: 2016       PAST SURGICAL HISTORY:  Past Surgical History:   Procedure Laterality Date     SECTION      x 2    COLONOSCOPY N/A 2016    Procedure: COLONOSCOPY;  Surgeon: Raheel Wiggins MD;  Location: [facility];   Service:     ENDOMETRIAL CRYOABLATION      HERNIA REPAIR      HYSTERECTOMY      SHOULDER SURGERY Left     TUBAL LIGATION         FAMILY HISTORY:  Family History   Problem Relation Age of Onset    Ovarian cancer Mother     Cancer Father     Alcohol abuse Father         alcoholism    Cirrhosis Father         hepatic    Throat cancer Father     Diabetes Maternal Grandfather         melliltus    Breast cancer Other     Ovarian cancer Other        SOCIAL HISTORY:  Social History   Substance Use Topics    Smoking status: Current Every Day Smoker     Packs/day: 0 50     Types: Cigarettes    Smokeless tobacco: Never Used    Alcohol use No       MEDICATIONS:    Current Outpatient Prescriptions:     topiramate (TOPAMAX) 25 mg sprinkle capsule, Take 1 capsule (25 mg total) by mouth 2 (two) times a day, Disp: 180 capsule, Rfl: 0    ALLERGIES:  No Known Allergies    REVIEW OF SYSTEMS:  Pertinent items are noted in HPI      LABS:  HgA1c: No results found for: HGBA1C  BMP:   Lab Results   Component Value Date    GLUCOSE 88 10/19/2016    CALCIUM 9 6 08/09/2017     08/09/2017    K 4 5 08/09/2017    CO2 27 08/09/2017     08/09/2017    BUN 14 08/09/2017    CREATININE 0 78 08/09/2017           _____________________________________________________  PHYSICAL EXAMINATION:  General: well developed and well nourished, alert, oriented times 3 and appears comfortable  Psychiatric: Normal  HEENT: Trachea Midline, No torticollis  Cardiovascular: No discernable arrhythmia  Pulmonary: No wheezing or stridor  Skin: No masses, erthema, lacerations, fluctation, ulcerations  Neurovascular: Pulses Intact    MUSCULOSKELETAL EXAMINATION:  LEFT SIDE:  Cubital Tunnel:  No ulnar nerve subluxation, Positive Elbow Flexion/Compression Test, Positive Scratch Collapse at the elbow and intrinsic 4-/5, fpl 5/5, fdp to small 4-/5, positive tinels at cubital tunnel  RIGHT SIDE:  Cubital Tunnel:  No ulnar nerve subluxation, Positive Elbow Flexion/Compression Test, Positive Scratch Collapse at the elbow and intrinsic 4/5, fpl 5/5, fdp to small 4-/5, positive tinels at the elbow      _____________________________________________________  STUDIES REVIEWED:  EMG: significant drop in conductive velocity       PROCEDURES PERFORMED:  Procedures  No Procedures performed today   Scribe Attestation    I,:   Nanette Guevara am acting as a scribe while in the presence of the attending physician :        I,:   Chidi Teran MD personally performed the services described in this documentation    as scribed in my presence :

## 2018-08-02 NOTE — PRE-PROCEDURE INSTRUCTIONS
No outpatient prescriptions have been marked as taking for the 8/16/18 encounter [facility] HOSPITAL Encounter)  Pre-op Showering Instructions for Surgery Patients    Before your operation, you play an important role in decreasing your risk for infection by washing with special antiseptic soap  This is an effective way to reduce bacteria on the skin which may help to prevent infections at the surgical site  Please read the following directions in advance  1  In the week before your operation, purchase a 4 ounce bottle of antiseptic soap containing chlorhexidine gluconate (CHG)  4%  Some brand names include: Aplicare®, Endure, and Hibiclens®  The cost is usually less than $5 00   For your convenience, the [employer] carries the soap   It may also be available at your doctors office or pre-admission testing center, and at most retail pharmacies   If you are allergic or sensitive to soaps containing CHG, please let your doctor know so another antiseptic can be suggested   CHG antiseptic soap is for external use only  2  The day before your operation, follow these instructions carefully to get ready   Please clean linens (sheets) on your bed; you should sleep on clean sheets after your evening shower   Get clean towels and washcloth ready - you need enough for 2 showers   Set aside clean underwear, pajamas, and clothing to wear after the showers     Reminders:   DO NOT use any other soap or body rinse on your skin during or after the antiseptic showers   DO NOT use lotion, powder, deodorant, or perfume/aftershave of any kind on your skin after your antiseptic shower   DO NOT shave any body parts in the 24 hours/day before your operation   DO NOT get the antiseptic soap in your eyes, ears, nose, mouth, or vaginal area    3   You will need to shower the night before AND the morning of your surgery  Shower 1:   The first evening before the operation, take the first shower   First, shampoo your hair with regular shampoo and rinse it completely before you use the antiseptic soap  After washing and rinsing your hair, rinse your body   Next, use a clean washcloth to apply the antiseptic soap and wash your body from the neck down to your toes using ½ bottle of the antiseptic soap   You will use the other ½ bottle for the second shower   Clean the area where your incision will be; lather this area well for about 2 minutes   If you are having head or neck surgery, wash areas with the antiseptic soap   Rinse yourself completely with running water   Use a clean towel to dry off   Wear clean underwear and clothing/pajamas  Shower 2   The morning of your operation, take the second shower following the same steps as Shower 1 using the second ½ of the bottle of antiseptic soap   Use clean cloths and towels to wash and dry yourself   Wear clean underwear and clothing

## 2018-08-15 RX ORDER — BUPIVACAINE HYDROCHLORIDE AND EPINEPHRINE 5; 5 MG/ML; UG/ML
30 INJECTION, SOLUTION EPIDURAL; INTRACAUDAL; PERINEURAL ONCE
Status: CANCELLED | OUTPATIENT
Start: 2018-08-16

## 2018-08-16 ENCOUNTER — HOSPITAL ENCOUNTER (OUTPATIENT)
Facility: HOSPITAL | Age: 33
Setting detail: OUTPATIENT SURGERY
Discharge: HOME | End: 2018-08-16
Attending: ORTHOPAEDIC SURGERY | Admitting: ORTHOPAEDIC SURGERY
Payer: COMMERCIAL

## 2018-08-16 ENCOUNTER — ANESTHESIA EVENT (OUTPATIENT)
Dept: PERIOP | Facility: HOSPITAL | Age: 33
End: 2018-08-16
Payer: COMMERCIAL

## 2018-08-16 ENCOUNTER — ANESTHESIA (OUTPATIENT)
Dept: PERIOP | Facility: HOSPITAL | Age: 33
End: 2018-08-16
Payer: COMMERCIAL

## 2018-08-16 VITALS
HEIGHT: 62 IN | BODY MASS INDEX: 22.82 KG/M2 | SYSTOLIC BLOOD PRESSURE: 111 MMHG | HEART RATE: 76 BPM | WEIGHT: 124 LBS | OXYGEN SATURATION: 100 % | DIASTOLIC BLOOD PRESSURE: 65 MMHG | RESPIRATION RATE: 18 BRPM | TEMPERATURE: 97.8 F

## 2018-08-16 DIAGNOSIS — G56.23 CUBITAL TUNNEL SYNDROME OF BOTH UPPER EXTREMITIES: Primary | ICD-10-CM

## 2018-08-16 PROCEDURE — 64718 REVISE ULNAR NERVE AT ELBOW: CPT | Performed by: ORTHOPAEDIC SURGERY

## 2018-08-16 RX ORDER — FENTANYL CITRATE/PF 50 MCG/ML
25 SYRINGE (ML) INJECTION
Status: DISCONTINUED | OUTPATIENT
Start: 2018-08-16 | End: 2018-08-16 | Stop reason: HOSPADM

## 2018-08-16 RX ORDER — FENTANYL CITRATE 50 UG/ML
INJECTION, SOLUTION INTRAMUSCULAR; INTRAVENOUS AS NEEDED
Status: DISCONTINUED | OUTPATIENT
Start: 2018-08-16 | End: 2018-08-16 | Stop reason: SURG

## 2018-08-16 RX ORDER — HYDROCODONE BITARTRATE AND ACETAMINOPHEN 5; 325 MG/1; MG/1
1 TABLET ORAL EVERY 6 HOURS PRN
Status: DISCONTINUED | OUTPATIENT
Start: 2018-08-16 | End: 2018-08-16 | Stop reason: HOSPADM

## 2018-08-16 RX ORDER — PROPOFOL 10 MG/ML
INJECTION, EMULSION INTRAVENOUS AS NEEDED
Status: DISCONTINUED | OUTPATIENT
Start: 2018-08-16 | End: 2018-08-16 | Stop reason: SURG

## 2018-08-16 RX ORDER — SODIUM CHLORIDE, SODIUM LACTATE, POTASSIUM CHLORIDE, CALCIUM CHLORIDE 600; 310; 30; 20 MG/100ML; MG/100ML; MG/100ML; MG/100ML
75 INJECTION, SOLUTION INTRAVENOUS CONTINUOUS
Status: DISCONTINUED | OUTPATIENT
Start: 2018-08-16 | End: 2018-08-16 | Stop reason: HOSPADM

## 2018-08-16 RX ORDER — HYDROCODONE BITARTRATE AND ACETAMINOPHEN 5; 325 MG/1; MG/1
1 TABLET ORAL EVERY 6 HOURS PRN
Qty: 10 TABLET | Refills: 0 | Status: SHIPPED | OUTPATIENT
Start: 2018-08-16 | End: 2018-08-26

## 2018-08-16 RX ORDER — ONDANSETRON 2 MG/ML
INJECTION INTRAMUSCULAR; INTRAVENOUS AS NEEDED
Status: DISCONTINUED | OUTPATIENT
Start: 2018-08-16 | End: 2018-08-16 | Stop reason: SURG

## 2018-08-16 RX ORDER — SENNOSIDES 8.6 MG
650 CAPSULE ORAL EVERY 8 HOURS
Qty: 15 TABLET | Refills: 0 | Status: SHIPPED | OUTPATIENT
Start: 2018-08-16 | End: 2018-09-12 | Stop reason: ALTCHOICE

## 2018-08-16 RX ORDER — LIDOCAINE HYDROCHLORIDE AND EPINEPHRINE 10; 10 MG/ML; UG/ML
INJECTION, SOLUTION INFILTRATION; PERINEURAL AS NEEDED
Status: DISCONTINUED | OUTPATIENT
Start: 2018-08-16 | End: 2018-08-16 | Stop reason: HOSPADM

## 2018-08-16 RX ORDER — MIDAZOLAM HYDROCHLORIDE 1 MG/ML
INJECTION INTRAMUSCULAR; INTRAVENOUS AS NEEDED
Status: DISCONTINUED | OUTPATIENT
Start: 2018-08-16 | End: 2018-08-16 | Stop reason: SURG

## 2018-08-16 RX ORDER — DEXAMETHASONE SODIUM PHOSPHATE 4 MG/ML
INJECTION, SOLUTION INTRA-ARTICULAR; INTRALESIONAL; INTRAMUSCULAR; INTRAVENOUS; SOFT TISSUE AS NEEDED
Status: DISCONTINUED | OUTPATIENT
Start: 2018-08-16 | End: 2018-08-16 | Stop reason: SURG

## 2018-08-16 RX ORDER — NAPROXEN 500 MG/1
500 TABLET ORAL 2 TIMES DAILY WITH MEALS
Qty: 10 TABLET | Refills: 0 | Status: SHIPPED | OUTPATIENT
Start: 2018-08-16 | End: 2018-08-16 | Stop reason: HOSPADM

## 2018-08-16 RX ADMIN — FENTANYL CITRATE 50 MCG: 50 INJECTION, SOLUTION INTRAMUSCULAR; INTRAVENOUS at 07:48

## 2018-08-16 RX ADMIN — PROPOFOL 50 MG: 10 INJECTION, EMULSION INTRAVENOUS at 07:51

## 2018-08-16 RX ADMIN — CEFAZOLIN SODIUM 2000 MG: 2 SOLUTION INTRAVENOUS at 07:55

## 2018-08-16 RX ADMIN — MIDAZOLAM HYDROCHLORIDE 2 MG: 1 INJECTION, SOLUTION INTRAMUSCULAR; INTRAVENOUS at 07:45

## 2018-08-16 RX ADMIN — SODIUM CHLORIDE, SODIUM LACTATE, POTASSIUM CHLORIDE, AND CALCIUM CHLORIDE 75 ML/HR: .6; .31; .03; .02 INJECTION, SOLUTION INTRAVENOUS at 06:23

## 2018-08-16 RX ADMIN — DEXAMETHASONE SODIUM PHOSPHATE 4 MG: 4 INJECTION, SOLUTION INTRAMUSCULAR; INTRAVENOUS at 08:00

## 2018-08-16 RX ADMIN — PROPOFOL 200 MG: 10 INJECTION, EMULSION INTRAVENOUS at 07:50

## 2018-08-16 RX ADMIN — HYDROCODONE BITARTRATE AND ACETAMINOPHEN 1 TABLET: 5; 325 TABLET ORAL at 09:05

## 2018-08-16 RX ADMIN — ONDANSETRON 4 MG: 2 INJECTION INTRAMUSCULAR; INTRAVENOUS at 08:11

## 2018-08-16 RX ADMIN — FENTANYL CITRATE 50 MCG: 50 INJECTION, SOLUTION INTRAMUSCULAR; INTRAVENOUS at 08:35

## 2018-08-16 NOTE — H&P (VIEW-ONLY)
ASSESSMENT/PLAN:    Assessment:   Cubital Tunnel Syndrome  bilateral    Plan:   Cubital Tunnel Release  Left with a 95% success rate  Post op restrictions were discussed with the patient today in regards to her job description as a   When her left side is healed and ready she will proceed with her right side  Follow Up: After Surgery    To Do Next Visit:    and Sutures out    General Discussions:       Operative Discussions:  Cubital Tunnel Release: The anatomy and physiology of cubital tunnel syndrome were discussed with the patient today in the office  Typically, increased elbow flexion activities decrease blood flow within the intraneural spaces, resulting in a feeling of numbness, tingling, weakness, or clumsiness within the hand and fingers  Occasionally, anatomic structures such as medial elbow osteophytes, the medial head of the triceps, were subluxing ulnar nerve may result in increased pressure or aggravation at the cubital tunnel  Typical signs and symptoms usually include numbness and tingling within the ring and small finger, weakness with , and weakness with pinch  Conservative treatment and includes nocturnal bracing to keep the elbow in a semi-extended position, activity modification, therapy, and avoiding excessive elbow flexion activities  A majority of patients typically respond to conservative treatment over a period of approximately 3-6 months  EMG/NCV testing of the ulnar nerve at the elbow is not as reliable as carpal tunnel syndrome  Surgical intervention in the form of in situ release of the ulnar nerve at the elbow or ulnar nerve transposition may be required in up to 20% of patients  The patient has elected to undergo an cubital tunnel release  The possibility of converting to an open procedure, or a subcutaneous or submuscular ulnar nerve transposition depending on the nerve stability was discussed with the patient    Typically, in the postoperative period, light activities are allowed immediately, driving is allowed when narcotic medications have stopped, and the incision may get wet after 5 days  Heavy activities will be allowed after follow up appointment in 1-2 weeks  Anti-inflammatory medications should be held for approximately 5 days postoperative  While the pain within the ring and small finger of the hands generally improves rapidly, the numbness and tingling, as well as the strength, will slowly improve over a period of weeks to months  Total recovery can take up to 18 months from the time of surgery  Numbness and tingling near the incision, or near the medial aspect of the forearm was discussed with the patient  The patient has an understanding of the above mentioned discussion  The risks and benefits of the procedure were explained to the patient, which include, but are not limited to: Bleeding, infection, recurrence, pain, scar, damage to tendons, damage to nerves, and damage to blood vessels, failure to give desired results and complications related to anesthesia   These risks, along with alternative conservative treatment options, and postoperative protocols were voiced back and understood by the patient   All questions were answered to the patient's satisfaction   The patient agrees to comply with a standard postoperative protocol, and is willing to proceed   Education was provided via written and auditory forms   There were no barriers to learning   Written handouts regarding wound care, incision and scar care, and general preoperative information was provided to the patient   Prior to surgery, the patient may be requested to stop all anti-inflammatory medications   Prophylactic aspirin, Plavix, and Coumadin may be allowed to be continued   Medications including vitamin E , ginkgo, and fish oil are requested to be stopped approximately one week prior to surgery   Hypertensive medications and beta blockers, if taken, should be continued  _____________________________________________________  CHIEF COMPLAINT:  Chief Complaint   Patient presents with    Left Hand - Follow-up    Right Hand - Follow-up         SUBJECTIVE:  Hernando Mathur is a 35y o  year old female who presents for follow up regarding Carpal Tunnel Syndrome  bilateral and Cubital Tunnel Syndrome  bilateral   Since last visit, Hernando Mathur has tried nocturnal elbow bracing and therapy for nerve glide exercises without relief  Pt states that her left side is more severe than her right  Today there is Pain  Mild  Intermittant  Aching and Numbness to the bilateral elbow  Radiation: Yes to the  small finger and with the occasionall numbness to all full fingers  Associated symptoms: No Complaints    PAST MEDICAL HISTORY:  Past Medical History:   Diagnosis Date    Abdominal pain     Abnormal MRI, cervical spine     last assessed: 2016    Back pain, chronic     Constipation     Fatigue     Hemorrhoids     resolved: 2017    Loss of weight     Nausea with vomiting     Tear of left rotator cuff     last assessed: 2016       PAST SURGICAL HISTORY:  Past Surgical History:   Procedure Laterality Date     SECTION      x 2    COLONOSCOPY N/A 2016    Procedure: COLONOSCOPY;  Surgeon: Can Wharton MD;  Location: [facility] GI LAB;   Service:     ENDOMETRIAL CRYOABLATION      HERNIA REPAIR      HYSTERECTOMY      SHOULDER SURGERY Left     TUBAL LIGATION         FAMILY HISTORY:  Family History   Problem Relation Age of Onset    Ovarian cancer Mother     Cancer Father     Alcohol abuse Father         alcoholism    Cirrhosis Father         hepatic    Throat cancer Father     Diabetes Maternal Grandfather         melliltus    Breast cancer Other     Ovarian cancer Other        SOCIAL HISTORY:  Social History   Substance Use Topics    Smoking status: Current Every Day Smoker     Packs/day: 0 50     Types: Cigarettes    Smokeless tobacco: Never Used    Alcohol use No       MEDICATIONS:    Current Outpatient Prescriptions:     topiramate (TOPAMAX) 25 mg sprinkle capsule, Take 1 capsule (25 mg total) by mouth 2 (two) times a day, Disp: 180 capsule, Rfl: 0    ALLERGIES:  No Known Allergies    REVIEW OF SYSTEMS:  Pertinent items are noted in HPI      LABS:  HgA1c: No results found for: HGBA1C  BMP:   Lab Results   Component Value Date    GLUCOSE 88 10/19/2016    CALCIUM 9 6 08/09/2017     08/09/2017    K 4 5 08/09/2017    CO2 27 08/09/2017     08/09/2017    BUN 14 08/09/2017    CREATININE 0 78 08/09/2017           _____________________________________________________  PHYSICAL EXAMINATION:  General: well developed and well nourished, alert, oriented times 3 and appears comfortable  Psychiatric: Normal  HEENT: Trachea Midline, No torticollis  Cardiovascular: No discernable arrhythmia  Pulmonary: No wheezing or stridor  Skin: No masses, erthema, lacerations, fluctation, ulcerations  Neurovascular: Pulses Intact    MUSCULOSKELETAL EXAMINATION:  LEFT SIDE:  Cubital Tunnel:  No ulnar nerve subluxation, Positive Elbow Flexion/Compression Test, Positive Scratch Collapse at the elbow and intrinsic 4-/5, fpl 5/5, fdp to small 4-/5, positive tinels at cubital tunnel  RIGHT SIDE:  Cubital Tunnel:  No ulnar nerve subluxation, Positive Elbow Flexion/Compression Test, Positive Scratch Collapse at the elbow and intrinsic 4/5, fpl 5/5, fdp to small 4-/5, positive tinels at the elbow      _____________________________________________________  STUDIES REVIEWED:  EMG: significant drop in conductive velocity       PROCEDURES PERFORMED:  Procedures  No Procedures performed today   Scribe Attestation    I,:   Bibi Hill am acting as a scribe while in the presence of the attending physician :        I,:   Adrian Hillman MD personally performed the services described in this documentation    as scribed in my presence :

## 2018-08-16 NOTE — ANESTHESIA PREPROCEDURE EVALUATION
Review of Systems/Medical History  Patient summary reviewed  Chart reviewed  History of anesthetic complications PONV    Cardiovascular   Pulmonary  Smoker cigarette smoker  , Tobacco cessation counseling given Cumulative Pack Years: 8,        GI/Hepatic            Endo/Other     GYN       Hematology   Musculoskeletal       Neurology    Headaches (migraines),   Comment: Chiari malformation nosymptoms, found incidentally Psychology   Anxiety,              Physical Exam    Airway    Mallampati score: I  TM Distance: >3 FB  Neck ROM: full     Dental   No notable dental hx     Cardiovascular  Cardiovascular exam normal    Pulmonary  Pulmonary exam normal     Other Findings        Anesthesia Plan  ASA Score- 2     Anesthesia Type- general with ASA Monitors  Additional Monitors:   Airway Plan: LMA  Plan Factors-    Induction- intravenous  Postoperative Plan-     Informed Consent- Anesthetic plan and risks discussed with patient

## 2018-08-16 NOTE — DISCHARGE INSTRUCTIONS
Post Operative Instructions    You have had surgery on your arm today, please read and follow the information below:  · Elevate your hand above your elbow during the next 24-48 hours to help with swelling  · Place your hand and arm over your head with motion at your shoulder three times a day  · Do not apply any cream/ointment/oil to your incisions including antibiotics  · Do not soak your hands in standing water (dishwater, tubs, Jacuzzi's, pools, etc ) until given permission (typically 2-3 weeks after injury)    Call the office if you notice any:  · Increased numbness or tingling of your hand or fingers that is not relieved with elevation  · Increasing pain that is not controlled with medication  · Difficulty chewing, breathing, swallowing  · Chest pains or shortness of breath  · Fever over 101 4 degrees  Bandage: Do NOT remove bandage until follow-up appointment  Motion: Move fingers into a fist 5 times a day, DO NOT move any splinted fingers  Weight bearing status: Avoid heavy lifting (>5 pounds) with the extremity that was operated on until follow up appointment  Normal activities of daily living are OK  Ice: Ice for 10 minutes every hour as needed for swelling x 24 hours  Sling: No sling necessary  Pain medication:   Tylenol Extended Release 650 mg every 8 hours  Norco/Hydrocodone one tab every 6 hours AS NEEDED for pain     Follow-up Appointment: 7-10 days  Please call the office if you have any questions or concerns regarding your post-operative care

## 2018-08-16 NOTE — OP NOTE
OPERATIVE REPORT  PATIENT NAME: Tim Carmona  :  1985  MRN: [id number]  Pt Location: [facility] OR    SURGERY DATE: 18    Surgeon(s) and Role:     * STAN Perez-CORRINA - Assisting     * Shankar Fagan MD - Primary    Pre-Op Diagnosis:  Cubital tunnel syndrome of both upper extremities [G56 23]    Post-Op Diagnosis Codes:     * Cubital tunnel syndrome of both upper extremities [G56 23]    Procedure(s):  RELEASE CUBITAL TUNNEL (Left)    Specimen(s):  * No orders in the log *    Estimated Blood Loss:   Minimal      Anesthesia Type:   General    Operative Indications: The patient has a history of Cubital Tunnel Syndrome  left that was recalcitrant to conservative management  The decision was made to bring the patient to the operating room for Cubital Tunnel Release  left  Risks of the procedure were explained which include, but are not limited to bleeding; infection; damage to nerves, arteries,veins, tendons; scar; pain; need for reoperation; failure to give desired result; and risks of anaesthesia  All questions were answered to satisfaction and they were willing to proceed  Operative Findings:  Left cubital tunnel    Complications:   None    Procedure and Technique:  After the patient, site, and procedure were identified, the patient was brought into the operating room in a supine position  General anaesthesia and local medication were provided  A well padded tourniquet was applied to the extremity, set at 250 mmHg  The  left upper extremity was then prepped and drapped in a normal, sterile, orthopedic fashion  After the patient, site, and procedure were once again identified, attention was turned to the left elbow  A small incision is made just posterior to the medial epicondyle, and we carefully dissected through the skin and subcutaneous tissues  Full thickness anterior and posterior flaps were made    We took care in preserving branches of the medial antebrachial cutaneous nerve   The fascia was identified overlying the ulnar nerve in the cubital tunnel  This is sharply divided in line with the incision  A small scissor was utilized to develop a space deep to the subcutaneous tissues but superficial to the fascia in both the proximal and distal direction  Utilizing careful dissection, the fascial overlying the FCU muscle was split in line with the incision and the FCU muscle fibers were split longitudinally  The deep fascia was also split with care taken not to damage the ulnar nerve  The motor branches to the FCU were also identified and protected throughout the procedure  The nerve was then freed proximally around the triceps muscle and the medial intermuscular septum  Care was taken to protect the longitudinal vessels running with the ulnar nerve  The large vascular plexus near the posterior portion of the intermuscular septum was identified and protected  The ulnar nerve was now freed from all entrapment points  Upon completion, the nerve was inspected and verified to be completely released without any evidence of iatrogenic injury  The elbow was then brought through a full range of motion, and the ulnar nerve was not found to sublux  At the completion of the procedure, hemostasis was obtained with cautery and direct pressure  The wounds were copiously irrigated with sterile solution  The wounds were closed with Vicryl and Prolene  Sterile dressings were applied, including Xeroform, gauze, tweeners, webril, ACE  Please note, all sponge, needle, and instrument counts were correct prior to closure  Loupe magnification was utilized  The patient tolerated the procedure well       I was present for the entire procedure and A qualified resident physician was not available    Patient Disposition:  PACU  and hemodynamically stable    SIGNATURE: Kati Galvan MD  DATE: 08/16/18  TIME: 8:17 AM

## 2018-08-27 PROBLEM — Z98.890 S/P CUBITAL TUNNEL RELEASE: Status: ACTIVE | Noted: 2018-08-27

## 2018-08-27 NOTE — PROGRESS NOTES
Assessment:   S/P Left CuTR 8/16/18    Plan:   Resume activities as tolerated and scar tissue massage  Will discuss with Dr Devra Cowden upon his return about getting her right side done    Follow Up:  PRN      CHIEF COMPLAINT:  No chief complaint on file  SUBJECTIVE:  Lacey Waldron is a 35y o  year old female who presents for follow up S/P Left CuTR 8/16/18  Patient states she is doing well         PHYSICAL EXAMINATION:  General: well developed and well nourished, alert, oriented times 3 and appears comfortable  Psychiatric: Normal    MUSCULOSKELETAL EXAMINATION:  Incision: clean  Range of Motion: As expected  Neurovascular status: a  Done today: Sutures out      STUDIES REVIEWED:  No Studies to review      PROCEDURES PERFORMED:  Procedures  No Procedures performed today

## 2018-08-29 ENCOUNTER — OFFICE VISIT (OUTPATIENT)
Dept: OBGYN CLINIC | Facility: HOSPITAL | Age: 33
End: 2018-08-29

## 2018-08-29 VITALS
SYSTOLIC BLOOD PRESSURE: 111 MMHG | HEIGHT: 62 IN | BODY MASS INDEX: 23.37 KG/M2 | WEIGHT: 127 LBS | DIASTOLIC BLOOD PRESSURE: 71 MMHG | HEART RATE: 80 BPM

## 2018-08-29 DIAGNOSIS — Z98.890 S/P CUBITAL TUNNEL RELEASE: Primary | ICD-10-CM

## 2018-08-29 PROCEDURE — 99024 POSTOP FOLLOW-UP VISIT: CPT | Performed by: PHYSICIAN ASSISTANT

## 2018-08-29 NOTE — PROGRESS NOTES
ASSESSMENT/PLAN:    Assessment:   S/P Left CuTR 18  R CuTS    Plan:   Patient is to gradually return to activities as tolerated on the left  Scar tissue massage discussed  Patient would like to proceed with R CuTR  Follow Up:  PRN    _____________________________________________________  CHIEF COMPLAINT:  Chief Complaint   Patient presents with    Left Elbow - Post-op         SUBJECTIVE:  Belkys Mancia is a 35y o  year old female who presents for follow up S/P Left CuTR 18  Patient states she is doing well  She is unsure if the n/t has improved but believes in general it feels better than prior to surgery  She is wondering when she can get her other side done  Continues to have pain to the elbow with radiation down to the fingers  PAST MEDICAL HISTORY:  Past Medical History:   Diagnosis Date    Abdominal pain     Abnormal MRI, cervical spine     last assessed: 2016    Back pain, chronic     Constipation     Fatigue     Hemorrhoids     resolved: 2017    Loss of weight     Migraines     Nausea with vomiting     PONV (postoperative nausea and vomiting)     Tear of left rotator cuff     last assessed: 2016       PAST SURGICAL HISTORY:  Past Surgical History:   Procedure Laterality Date     SECTION      x 2    COLONOSCOPY N/A 2016    Procedure: COLONOSCOPY;  Surgeon: Dacia Jaquez MD;  Location: [facility] GI LAB;   Service:     ENDOMETRIAL CRYOABLATION      HERNIA REPAIR      HYSTERECTOMY      CT REVISE ULNAR NERVE AT ELBOW Left 2018    Procedure: RELEASE CUBITAL TUNNEL;  Surgeon: Miles Solis MD;  Location: [facility] OR;  Service: Orthopedics    SHOULDER SURGERY Left     TUBAL LIGATION         FAMILY HISTORY:  Family History   Problem Relation Age of Onset    Ovarian cancer Mother     Cancer Father     Alcohol abuse Father         alcoholism    Cirrhosis Father         hepatic    Throat cancer Father     Diabetes Maternal Grandfather melliltus    Breast cancer Other     Ovarian cancer Other        SOCIAL HISTORY:  Social History   Substance Use Topics    Smoking status: Current Every Day Smoker     Packs/day: 0 25     Years: 15 00     Types: Cigarettes    Smokeless tobacco: Never Used    Alcohol use No       MEDICATIONS:    Current Outpatient Prescriptions:     acetaminophen (TYLENOL 8 HOUR) 650 mg CR tablet, Take 1 tablet (650 mg total) by mouth every 8 (eight) hours, Disp: 15 tablet, Rfl: 0    ALLERGIES:  No Known Allergies    REVIEW OF SYSTEMS:  Pertinent items are noted in HPI  A comprehensive review of systems was negative  LABS:  HgA1c: No results found for: HGBA1C  BMP:   Lab Results   Component Value Date    CALCIUM 9 6 08/09/2017     08/09/2017    K 4 5 08/09/2017    CO2 27 08/09/2017     08/09/2017    BUN 14 08/09/2017    CREATININE 0 78 08/09/2017           _____________________________________________________  PHYSICAL EXAMINATION:  General: well developed and well nourished, alert, oriented times 3 and appears comfortable  Psychiatric: Normal  HEENT: Trachea Midline, No torticollis  Cardiovascular: No discernable arrhythmia  Pulmonary: No wheezing or stridor  Skin: No masses, erthema, lacerations, fluctation, ulcerations  Neurovascular: Sensation Intact to the Median, Ulnar, Radial Nerve, Motor Intact to the Median, Ulnar, Radial Nerve and Pulses Intact    MUSCULOSKELETAL EXAMINATION:  LEFT SIDE:  Cubital Tunnel:  Incision c/d/i  No ulnar nerve subluxation  FROM  Negative tinel's at the cubital tunnel   Intrinsics 4+/5, FDP small finger 5/5  RIGHT SIDE:  Cubital Tunnel:  No atrophy, Negative clawing, Positive Weakness intrinsics 4/5, FDP small finger 4+/5, Tinel's to the cubital tunnel and Positive Elbow Flexion/Compression Test    _____________________________________________________  STUDIES REVIEWED:  No New Studies to review      PROCEDURES PERFORMED:  Procedures  No Procedures performed today

## 2018-09-06 PROBLEM — G56.21 CUBITAL TUNNEL SYNDROME ON RIGHT: Status: ACTIVE | Noted: 2018-09-06

## 2018-09-12 NOTE — PRE-PROCEDURE INSTRUCTIONS
No outpatient prescriptions have been marked as taking for the 9/20/18 encounter [facility] HOSPITAL Encounter)  Before your operation, you play an important role in decreasing your risk for infection by washing with special antiseptic soap  This is an effective way to reduce bacteria on the skin which may help to prevent infections at the surgical site  Please read the following directions in advance  1  In the week before your operation purchase a 4 ounce bottle of antiseptic soap containing chlorhexidine gluconate 4%  Some brand names include: Aplicare, Endure, and Hibiclens  The cost is usually less than $5 00  · For your convenience, the [employer] carries the soap  · It may also be available at your doctor's office or pre-admission testing center, and at most retail pharmacies  · If you are allergic or sensitive to soaps containing chlorhexidine gluconate (CHG), please let your doctor know so another antiseptic soap can be suggested  · CHG antiseptic soap is for external use only  2      The day before your operation follow these directions carefully to get ready  · Place clean lines (sheets) on your bed; you should sleep on clean sheets after your evening shower  · Get clean towels and washcloths ready - you need enough for 2 showers  · Set aside clean underwear, pajamas, and clothing to wear after the shower  Reminders:  · DO NOT use any other soap or body rinse on your skin during or after the antiseptic showers  · DO NOT use lotion , powder, deodorant, or perfume/aftershave of any kind on your skin after your antiseptic shower  · DO NOT shave any body parts in the 24 hours/the day before your operation  · DO NOT get the antiseptic soap in your eyes, ears, nose, mouth, or vaginal area  3      You will need to shower the night before AND the morning of your Surgery  Shower 1:  · The evening before your operation, take the fist shower    · First, shampoo your hair with regular shampoo and rinse it completely before you use the anitseptic soap  After washing and rinsing your hair, rinse your body  · Next, use a clean wash cloth to apply the antiseptic soap and wash your body from the neck down to your toes using 1/2 bottle of the antiseptic soap  You will use the other 1/2 bottle for the second shower  · Clean the area where your incision will be; later this area well for about 2 minutes  · If you ar having head or neck surgery, wash areas with the antiseptic soap  · Rinse yourself completely with running water  · Use a clean towel to dry off  · Wear clean underwear and clothing/pajamas  Shower 2:  · The Morning of your operation, take the second shower following the same steps as Shower 1 using the second 1/2 of the bottle of antiseptic soap  · Use clean cloths and towels to was and dry yourself off  · Wear clean underwear and clothing

## 2018-09-20 ENCOUNTER — HOSPITAL ENCOUNTER (OUTPATIENT)
Facility: HOSPITAL | Age: 33
Setting detail: OUTPATIENT SURGERY
Discharge: HOME | End: 2018-09-20
Attending: ORTHOPAEDIC SURGERY | Admitting: ORTHOPAEDIC SURGERY
Payer: COMMERCIAL

## 2018-09-20 ENCOUNTER — ANESTHESIA (OUTPATIENT)
Dept: PERIOP | Facility: HOSPITAL | Age: 33
End: 2018-09-20
Payer: COMMERCIAL

## 2018-09-20 ENCOUNTER — ANESTHESIA EVENT (OUTPATIENT)
Dept: PERIOP | Facility: HOSPITAL | Age: 33
End: 2018-09-20
Payer: COMMERCIAL

## 2018-09-20 VITALS
DIASTOLIC BLOOD PRESSURE: 66 MMHG | BODY MASS INDEX: 23 KG/M2 | TEMPERATURE: 98.5 F | HEART RATE: 78 BPM | SYSTOLIC BLOOD PRESSURE: 111 MMHG | OXYGEN SATURATION: 99 % | RESPIRATION RATE: 18 BRPM | WEIGHT: 125 LBS | HEIGHT: 62 IN

## 2018-09-20 DIAGNOSIS — G56.21 CUBITAL TUNNEL SYNDROME ON RIGHT: Primary | ICD-10-CM

## 2018-09-20 PROCEDURE — 64718 REVISE ULNAR NERVE AT ELBOW: CPT | Performed by: ORTHOPAEDIC SURGERY

## 2018-09-20 RX ORDER — METOCLOPRAMIDE HYDROCHLORIDE 5 MG/ML
10 INJECTION INTRAMUSCULAR; INTRAVENOUS ONCE AS NEEDED
Status: DISCONTINUED | OUTPATIENT
Start: 2018-09-20 | End: 2018-09-20 | Stop reason: HOSPADM

## 2018-09-20 RX ORDER — HYDROCODONE BITARTRATE AND ACETAMINOPHEN 5; 325 MG/1; MG/1
1 TABLET ORAL EVERY 6 HOURS PRN
Qty: 10 TABLET | Refills: 0 | Status: SHIPPED | OUTPATIENT
Start: 2018-09-20 | End: 2018-09-30

## 2018-09-20 RX ORDER — NAPROXEN 500 MG/1
500 TABLET ORAL 2 TIMES DAILY WITH MEALS
Qty: 10 TABLET | Refills: 0 | Status: SHIPPED | OUTPATIENT
Start: 2018-09-20 | End: 2019-02-13

## 2018-09-20 RX ORDER — FENTANYL CITRATE 50 UG/ML
INJECTION, SOLUTION INTRAMUSCULAR; INTRAVENOUS AS NEEDED
Status: DISCONTINUED | OUTPATIENT
Start: 2018-09-20 | End: 2018-09-20 | Stop reason: SURG

## 2018-09-20 RX ORDER — HYDROCODONE BITARTRATE AND ACETAMINOPHEN 5; 325 MG/1; MG/1
1 TABLET ORAL EVERY 6 HOURS PRN
Status: DISCONTINUED | OUTPATIENT
Start: 2018-09-20 | End: 2018-09-20 | Stop reason: HOSPADM

## 2018-09-20 RX ORDER — ONDANSETRON 2 MG/ML
INJECTION INTRAMUSCULAR; INTRAVENOUS AS NEEDED
Status: DISCONTINUED | OUTPATIENT
Start: 2018-09-20 | End: 2018-09-20 | Stop reason: SURG

## 2018-09-20 RX ORDER — SENNOSIDES 8.6 MG
650 CAPSULE ORAL EVERY 8 HOURS
Qty: 15 TABLET | Refills: 0 | Status: SHIPPED | OUTPATIENT
Start: 2018-09-20 | End: 2019-07-29

## 2018-09-20 RX ORDER — MIDAZOLAM HYDROCHLORIDE 1 MG/ML
INJECTION INTRAMUSCULAR; INTRAVENOUS AS NEEDED
Status: DISCONTINUED | OUTPATIENT
Start: 2018-09-20 | End: 2018-09-20 | Stop reason: SURG

## 2018-09-20 RX ORDER — GLYCOPYRROLATE 0.2 MG/ML
INJECTION INTRAMUSCULAR; INTRAVENOUS AS NEEDED
Status: DISCONTINUED | OUTPATIENT
Start: 2018-09-20 | End: 2018-09-20 | Stop reason: SURG

## 2018-09-20 RX ORDER — MAGNESIUM HYDROXIDE 1200 MG/15ML
LIQUID ORAL AS NEEDED
Status: DISCONTINUED | OUTPATIENT
Start: 2018-09-20 | End: 2018-09-20 | Stop reason: HOSPADM

## 2018-09-20 RX ORDER — ACETAMINOPHEN 325 MG/1
650 TABLET ORAL ONCE
Status: DISCONTINUED | OUTPATIENT
Start: 2018-09-20 | End: 2018-09-20 | Stop reason: HOSPADM

## 2018-09-20 RX ORDER — SODIUM CHLORIDE, SODIUM LACTATE, POTASSIUM CHLORIDE, CALCIUM CHLORIDE 600; 310; 30; 20 MG/100ML; MG/100ML; MG/100ML; MG/100ML
75 INJECTION, SOLUTION INTRAVENOUS CONTINUOUS
Status: DISCONTINUED | OUTPATIENT
Start: 2018-09-20 | End: 2018-09-20 | Stop reason: HOSPADM

## 2018-09-20 RX ORDER — FENTANYL CITRATE/PF 50 MCG/ML
25 SYRINGE (ML) INJECTION
Status: DISCONTINUED | OUTPATIENT
Start: 2018-09-20 | End: 2018-09-20 | Stop reason: HOSPADM

## 2018-09-20 RX ORDER — LIDOCAINE HYDROCHLORIDE AND EPINEPHRINE 10; 10 MG/ML; UG/ML
INJECTION, SOLUTION INFILTRATION; PERINEURAL AS NEEDED
Status: DISCONTINUED | OUTPATIENT
Start: 2018-09-20 | End: 2018-09-20 | Stop reason: HOSPADM

## 2018-09-20 RX ORDER — EPHEDRINE SULFATE 50 MG/ML
INJECTION, SOLUTION INTRAVENOUS AS NEEDED
Status: DISCONTINUED | OUTPATIENT
Start: 2018-09-20 | End: 2018-09-20 | Stop reason: SURG

## 2018-09-20 RX ORDER — PROPOFOL 10 MG/ML
INJECTION, EMULSION INTRAVENOUS AS NEEDED
Status: DISCONTINUED | OUTPATIENT
Start: 2018-09-20 | End: 2018-09-20 | Stop reason: SURG

## 2018-09-20 RX ADMIN — FENTANYL CITRATE 50 MCG: 50 INJECTION, SOLUTION INTRAMUSCULAR; INTRAVENOUS at 11:08

## 2018-09-20 RX ADMIN — SODIUM CHLORIDE, SODIUM LACTATE, POTASSIUM CHLORIDE, AND CALCIUM CHLORIDE 75 ML/HR: .6; .31; .03; .02 INJECTION, SOLUTION INTRAVENOUS at 12:15

## 2018-09-20 RX ADMIN — FENTANYL CITRATE 25 MCG: 50 INJECTION, SOLUTION INTRAMUSCULAR; INTRAVENOUS at 11:31

## 2018-09-20 RX ADMIN — GLYCOPYRROLATE 0.2 MG: 0.2 INJECTION, SOLUTION INTRAMUSCULAR; INTRAVENOUS at 11:07

## 2018-09-20 RX ADMIN — SODIUM CHLORIDE, SODIUM LACTATE, POTASSIUM CHLORIDE, AND CALCIUM CHLORIDE 75 ML/HR: .6; .31; .03; .02 INJECTION, SOLUTION INTRAVENOUS at 10:00

## 2018-09-20 RX ADMIN — CEFAZOLIN SODIUM 2000 MG: 2 SOLUTION INTRAVENOUS at 11:16

## 2018-09-20 RX ADMIN — EPHEDRINE SULFATE 5 MG: 50 INJECTION, SOLUTION INTRAMUSCULAR; INTRAVENOUS; SUBCUTANEOUS at 11:24

## 2018-09-20 RX ADMIN — ONDANSETRON 4 MG: 2 INJECTION INTRAMUSCULAR; INTRAVENOUS at 11:39

## 2018-09-20 RX ADMIN — DEXAMETHASONE SODIUM PHOSPHATE 4 MG: 10 INJECTION INTRAMUSCULAR; INTRAVENOUS at 11:15

## 2018-09-20 RX ADMIN — PROPOFOL 200 MG: 10 INJECTION, EMULSION INTRAVENOUS at 11:10

## 2018-09-20 RX ADMIN — MIDAZOLAM HYDROCHLORIDE 2 MG: 1 INJECTION, SOLUTION INTRAMUSCULAR; INTRAVENOUS at 11:06

## 2018-09-20 RX ADMIN — FENTANYL CITRATE 25 MCG: 50 INJECTION, SOLUTION INTRAMUSCULAR; INTRAVENOUS at 12:00

## 2018-09-20 RX ADMIN — HYDROCODONE BITARTRATE AND ACETAMINOPHEN 1 TABLET: 5; 325 TABLET ORAL at 12:59

## 2018-09-20 NOTE — H&P (VIEW-ONLY)
ASSESSMENT/PLAN:    Assessment:   S/P Left CuTR 18  R CuTS    Plan:   Patient is to gradually return to activities as tolerated on the left  Scar tissue massage discussed  Patient would like to proceed with R CuTR  Follow Up:  PRN    _____________________________________________________  CHIEF COMPLAINT:  Chief Complaint   Patient presents with    Left Elbow - Post-op         SUBJECTIVE:  Shelby Jimenez is a 35y o  year old female who presents for follow up S/P Left CuTR 18  Patient states she is doing well  She is unsure if the n/t has improved but believes in general it feels better than prior to surgery  She is wondering when she can get her other side done  Continues to have pain to the elbow with radiation down to the fingers  PAST MEDICAL HISTORY:  Past Medical History:   Diagnosis Date    Abdominal pain     Abnormal MRI, cervical spine     last assessed: 2016    Back pain, chronic     Constipation     Fatigue     Hemorrhoids     resolved: 2017    Loss of weight     Migraines     Nausea with vomiting     PONV (postoperative nausea and vomiting)     Tear of left rotator cuff     last assessed: 2016       PAST SURGICAL HISTORY:  Past Surgical History:   Procedure Laterality Date     SECTION      x 2    COLONOSCOPY N/A 2016    Procedure: COLONOSCOPY;  Surgeon: Raheel Wiggins MD;  Location: [facility];   Service:     ENDOMETRIAL CRYOABLATION      HERNIA REPAIR      HYSTERECTOMY      WI REVISE ULNAR NERVE AT ELBOW Left 2018    Procedure: RELEASE CUBITAL TUNNEL;  Surgeon: Reddy Miranda MD;  Location: [facility] OR;  Service: Orthopedics    SHOULDER SURGERY Left     TUBAL LIGATION         FAMILY HISTORY:  Family History   Problem Relation Age of Onset    Ovarian cancer Mother     Cancer Father     Alcohol abuse Father         alcoholism    Cirrhosis Father         hepatic    Throat cancer Father     Diabetes Maternal Grandfather melliltus    Breast cancer Other     Ovarian cancer Other        SOCIAL HISTORY:  Social History   Substance Use Topics    Smoking status: Current Every Day Smoker     Packs/day: 0 25     Years: 15 00     Types: Cigarettes    Smokeless tobacco: Never Used    Alcohol use No       MEDICATIONS:    Current Outpatient Prescriptions:     acetaminophen (TYLENOL 8 HOUR) 650 mg CR tablet, Take 1 tablet (650 mg total) by mouth every 8 (eight) hours, Disp: 15 tablet, Rfl: 0    ALLERGIES:  No Known Allergies    REVIEW OF SYSTEMS:  Pertinent items are noted in HPI  A comprehensive review of systems was negative  LABS:  HgA1c: No results found for: HGBA1C  BMP:   Lab Results   Component Value Date    CALCIUM 9 6 08/09/2017     08/09/2017    K 4 5 08/09/2017    CO2 27 08/09/2017     08/09/2017    BUN 14 08/09/2017    CREATININE 0 78 08/09/2017           _____________________________________________________  PHYSICAL EXAMINATION:  General: well developed and well nourished, alert, oriented times 3 and appears comfortable  Psychiatric: Normal  HEENT: Trachea Midline, No torticollis  Cardiovascular: No discernable arrhythmia  Pulmonary: No wheezing or stridor  Skin: No masses, erthema, lacerations, fluctation, ulcerations  Neurovascular: Sensation Intact to the Median, Ulnar, Radial Nerve, Motor Intact to the Median, Ulnar, Radial Nerve and Pulses Intact    MUSCULOSKELETAL EXAMINATION:  LEFT SIDE:  Cubital Tunnel:  Incision c/d/i  No ulnar nerve subluxation  FROM  Negative tinel's at the cubital tunnel   Intrinsics 4+/5, FDP small finger 5/5  RIGHT SIDE:  Cubital Tunnel:  No atrophy, Negative clawing, Positive Weakness intrinsics 4/5, FDP small finger 4+/5, Tinel's to the cubital tunnel and Positive Elbow Flexion/Compression Test    _____________________________________________________  STUDIES REVIEWED:  No New Studies to review      PROCEDURES PERFORMED:  Procedures  No Procedures performed today

## 2018-09-20 NOTE — OP NOTE
OPERATIVE REPORT  PATIENT NAME: Otilia Hernandez  :  1985  MRN: [id number]  Pt Location: [facility] OR    SURGERY DATE: 18    Surgeon(s) and Role:     * Jose Vázquez MD - Primary     * Steffanie Brooks MD - Assisting    Pre-Op Diagnosis:  Cubital tunnel syndrome on right [G56 21]    Post-Op Diagnosis Codes:     * Cubital tunnel syndrome on right [G56 21]    Procedure(s):  RELEASE CUBITAL TUNNEL (Right)    Specimen(s):  * No orders in the log *    Estimated Blood Loss:   Minimal      Anesthesia Type:   General    Operative Indications: The patient has a history of Cubital Tunnel Syndrome  right that was recalcitrant to conservative management  The decision was made to bring the patient to the operating room for Cubital Tunnel Release  right  Risks of the procedure were explained which include, but are not limited to bleeding; infection; damage to nerves, arteries,veins, tendons; scar; pain; need for reoperation; failure to give desired result; and risks of anaesthesia  All questions were answered to satisfaction and they were willing to proceed  Operative Findings:  Right cubital tunnel syndrome    Complications:   None    Procedure and Technique:  After the patient, site, and procedure were identified, the patient was brought into the operating room in a supine position  General anaesthesia and local medication were provided  A well padded tourniquet was applied to the extremity, set at 250 mmHg  The  right upper extremity was then prepped and drapped in a normal, sterile, orthopedic fashion  After the patient, site, and procedure were once again identified, attention was turned to the right elbow  A small incision is made just posterior to the medial epicondyle, and we carefully dissected through the skin and subcutaneous tissues  Full thickness anterior and posterior flaps were made  We took care in preserving branches of the medial antebrachial cutaneous nerve    The fascia was identified overlying the ulnar nerve in the cubital tunnel  This is sharply divided in line with the incision  A small scissor was utilized to develop a space deep to the subcutaneous tissues but superficial to the fascia in both the proximal and distal direction  Utilizing careful dissection, the fascial overlying the FCU muscle was split in line with the incision and the FCU muscle fibers were split longitudinally  The deep fascia was also split with care taken not to damage the ulnar nerve  The motor branches to the FCU were also identified and protected throughout the procedure  The nerve was then freed proximally around the triceps muscle and the medial intermuscular septum  Care was taken to protect the longitudinal vessels running with the ulnar nerve  The large vascular plexus near the posterior portion of the intermuscular septum was identified and protected  The ulnar nerve was now freed from all entrapment points  Upon completion, the nerve was inspected and verified to be completely released without any evidence of iatrogenic injury  The elbow was then brought through a full range of motion, and the ulnar nerve was not found to sublux  At the completion of the procedure, hemostasis was obtained with cautery and direct pressure  The wounds were copiously irrigated with sterile solution  The wounds were closed with Vicryl and Prolene  Sterile dressings were applied, including Xeroform, gauze, tweeners, webril, ACE  Please note, all sponge, needle, and instrument counts were correct prior to closure  Loupe magnification was utilized  The patient tolerated the procedure well       I was present for the entire procedure    Patient Disposition:  PACU , hemodynamically stable and extubated and stable    SIGNATURE: Alcides Long MD  DATE: 09/20/18  TIME: 11:53 AM

## 2018-09-20 NOTE — DISCHARGE INSTRUCTIONS
Post Operative Instructions    You have had surgery on your arm today, please read and follow the information below:  · Elevate your hand above your elbow during the next 24-48 hours to help with swelling  · Place your hand and arm over your head with motion at your shoulder three times a day  · Do not apply any cream/ointment/oil to your incisions including antibiotics  · Do not soak your hands in standing water (dishwater, tubs, Jacuzzi's, pools, etc ) until given permission (typically 2-3 weeks after injury)    Call the office if you notice any:  · Increased numbness or tingling of your hand or fingers that is not relieved with elevation  · Increasing pain that is not controlled with medication  · Difficulty chewing, breathing, swallowing  · Chest pains or shortness of breath  · Fever over 101 4 degrees  Bandage: Do NOT remove bandage until follow-up appointment  Motion: Move fingers into a fist 5 times a day, DO NOT move any splinted fingers  Weight bearing status: Avoid heavy lifting (>5 pounds) with the extremity that was operated on until follow up appointment  Normal activities of daily living are OK  Ice: Ice for 10 minutes every hour as needed for swelling x 24 hours  Sling: No sling necessary  Pain medication:   Naproxen 500 mg two times a day   Tylenol Extended Release 650 mg every 8 hours  Norco/Hydrocodone one tab every 6 hours AS NEEDED for pain     Follow-up Appointment: 7-10 days  Please call the office if you have any questions or concerns regarding your post-operative care

## 2018-09-20 NOTE — ANESTHESIA PREPROCEDURE EVALUATION
Review of Systems/Medical History  Patient summary reviewed  Chart reviewed  History of anesthetic complications PONV    Cardiovascular  Negative cardio ROS Exercise tolerance (METS): >4,     Pulmonary  Smoker cigarette smoker  , Tobacco cessation counseling given Cumulative Pack Years: 8,        GI/Hepatic  Negative GI/hepatic ROS               Endo/Other  Negative endo/other ROS      GYN    Hysterectomy,        Hematology  Negative hematology ROS      Musculoskeletal  Negative musculoskeletal ROS        Neurology    Headaches,   Comment: Chiari 1 malformation, no symptoms, incidental finding Psychology   Anxiety,              Physical Exam    Airway    Mallampati score: I  TM Distance: >3 FB  Neck ROM: full     Dental   No notable dental hx     Cardiovascular  Comment: Negative ROS, Rhythm: regular, Rate: normal, Cardiovascular exam normal    Pulmonary  Pulmonary exam normal Breath sounds clear to auscultation,     Other Findings        Anesthesia Plan  ASA Score- 2     Anesthesia Type- general with ASA Monitors  Additional Monitors:   Airway Plan: LMA  Plan Factors- Patient instructed to abstain from smoking on day of procedure  Patient did not smoke on day of surgery  Induction- intravenous  Postoperative Plan- Plan for postoperative opioid use  Informed Consent- Anesthetic plan and risks discussed with patient

## 2018-10-01 ENCOUNTER — OFFICE VISIT (OUTPATIENT)
Dept: OBGYN CLINIC | Facility: CLINIC | Age: 33
End: 2018-10-01

## 2018-10-01 VITALS
BODY MASS INDEX: 23.63 KG/M2 | DIASTOLIC BLOOD PRESSURE: 69 MMHG | HEIGHT: 62 IN | SYSTOLIC BLOOD PRESSURE: 110 MMHG | HEART RATE: 76 BPM | WEIGHT: 128.4 LBS

## 2018-10-01 DIAGNOSIS — Z98.890 S/P CUBITAL TUNNEL RELEASE: Primary | ICD-10-CM

## 2018-10-01 PROCEDURE — 99024 POSTOP FOLLOW-UP VISIT: CPT | Performed by: ORTHOPAEDIC SURGERY

## 2018-10-01 NOTE — PROGRESS NOTES
Assessment:   B/L cubital tunnel release  Right 9/20/18  Left 8/16/18    Plan:   therapy and ice  Activities as tolerated  Patient educated in the importance of quitting smoking  Follow Up:  2 mos    To Do Next Visit:         CHIEF COMPLAINT:  Chief Complaint   Patient presents with    Right Elbow - Post-op         SUBJECTIVE:  Wilson Barnes is a 35y o  year old female who presents for follow up after Cubital Tunnel Release  Right done 9/20/18 and left done 8/16/18  Today patient has Numbness in all 5 digits of the right hand  Patient has not been referred to therapy yet  Patient retured to work the following day          PHYSICAL EXAMINATION:  General: well developed and well nourished, alert, oriented times 3 and appears comfortable  Psychiatric: Normal    MUSCULOSKELETAL EXAMINATION:  Incision: Clean, dry, intact  Range of Motion: As expected  Neurovascular status: Neuro intact, good cap refill  Activity Restrictions: No restrictions  Intrinsics Right 4+/5, Left 5/5  AIN right 5/5, left 5/5  APB right 5/5, let 5/5      STUDIES REVIEWED:  No Studies to review      PROCEDURES PERFORMED:  Procedures  No Procedures performed today       Scribe Attestation    I,:   Rosaura Gillespie am acting as a scribe while in the presence of the attending physician :        I,:   Francisco J Mock MD personally performed the services described in this documentation    as scribed in my presence :          Scribe Attestation    I,:   Rosaura Gillespie am acting as a scribe while in the presence of the attending physician :        I,:   Francisco J Mock MD personally performed the services described in this documentation    as scribed in my presence :

## 2018-10-10 ENCOUNTER — TELEPHONE (OUTPATIENT)
Dept: FAMILY MEDICINE CLINIC | Facility: CLINIC | Age: 33
End: 2018-10-10

## 2018-10-10 ENCOUNTER — OFFICE VISIT (OUTPATIENT)
Dept: FAMILY MEDICINE CLINIC | Facility: CLINIC | Age: 33
End: 2018-10-10
Payer: COMMERCIAL

## 2018-10-10 VITALS
TEMPERATURE: 98.9 F | SYSTOLIC BLOOD PRESSURE: 98 MMHG | WEIGHT: 86.4 LBS | BODY MASS INDEX: 15.8 KG/M2 | DIASTOLIC BLOOD PRESSURE: 70 MMHG

## 2018-10-10 DIAGNOSIS — H60.393 OTHER INFECTIVE ACUTE OTITIS EXTERNA OF BOTH EARS: Primary | ICD-10-CM

## 2018-10-10 DIAGNOSIS — H65.23 BILATERAL CHRONIC SEROUS OTITIS MEDIA: Primary | ICD-10-CM

## 2018-10-10 DIAGNOSIS — H65.23 BILATERAL CHRONIC SEROUS OTITIS MEDIA: ICD-10-CM

## 2018-10-10 PROBLEM — H65.20 CHRONIC OTITIS MEDIA WITH SEROUS EFFUSION: Status: ACTIVE | Noted: 2018-10-10

## 2018-10-10 PROBLEM — H60.90 OTITIS EXTERNA: Status: ACTIVE | Noted: 2018-10-10

## 2018-10-10 PROCEDURE — 4004F PT TOBACCO SCREEN RCVD TLK: CPT | Performed by: FAMILY MEDICINE

## 2018-10-10 PROCEDURE — 99213 OFFICE O/P EST LOW 20 MIN: CPT | Performed by: FAMILY MEDICINE

## 2018-10-10 RX ORDER — OFLOXACIN 3 MG/ML
10 SOLUTION AURICULAR (OTIC) 2 TIMES DAILY
Qty: 5 ML | Refills: 0 | Status: SHIPPED | OUTPATIENT
Start: 2018-10-10 | End: 2019-07-29

## 2018-10-10 RX ORDER — FLUTICASONE PROPIONATE 50 MCG
2 SPRAY, SUSPENSION (ML) NASAL 2 TIMES DAILY
Qty: 16 G | Refills: 0 | Status: SHIPPED | OUTPATIENT
Start: 2018-10-10 | End: 2019-07-29

## 2018-10-10 RX ORDER — CIPROFLOXACIN AND DEXAMETHASONE 3; 1 MG/ML; MG/ML
4 SUSPENSION/ DROPS AURICULAR (OTIC) 2 TIMES DAILY
Qty: 7.5 ML | Refills: 0 | Status: SHIPPED | OUTPATIENT
Start: 2018-10-10 | End: 2019-07-29

## 2018-10-10 NOTE — TELEPHONE ENCOUNTER
Pt's pharmacy called them once they received the order, they do not have the new med in stock and more than likely would not be covered as well  The pharmacy instructed them to have a generic prescribed  Please send in a new prescription that is generic

## 2018-10-10 NOTE — PROGRESS NOTES
Assessment/Plan:    70-year-old woman with:  Otitis externa along with serous TM effusion  Will give Ciprodex and Flonase and advised her to call back if symptoms not improving or worsening    No problem-specific Assessment & Plan notes found for this encounter  Diagnoses and all orders for this visit:    Other infective acute otitis externa of both ears  -     fluticasone (FLONASE) 50 mcg/act nasal spray; 2 sprays into each nostril 2 (two) times a day  -     ciprofloxacin-dexamethasone (CIPRODEX) otic suspension; Administer 4 drops into both ears 2 (two) times a day    Bilateral chronic serous otitis media  -     fluticasone (FLONASE) 50 mcg/act nasal spray; 2 sprays into each nostril 2 (two) times a day  -     ciprofloxacin-dexamethasone (CIPRODEX) otic suspension; Administer 4 drops into both ears 2 (two) times a day          Subjective:   Chief Complaint   Patient presents with    Earache     patient states that she has earche in both ears, went to urgent care and left without script  Patient ID: Petey Powell is a 35 y o  female  Patient is a 70-year-old woman who presents complaining of some bilateral ear pain left greater than right for the past week or so  No fevers chills nausea vomiting  Tolerating p o  intake  Earache    Associated symptoms include ear discharge  The following portions of the patient's history were reviewed and updated as appropriate: allergies, current medications, past family history, past medical history, past social history, past surgical history and problem list     Review of Systems   Constitutional: Negative  HENT: Positive for ear discharge and ear pain  Eyes: Negative  Respiratory: Negative  Cardiovascular: Negative  Gastrointestinal: Negative  Endocrine: Negative  Genitourinary: Negative  Musculoskeletal: Negative  Allergic/Immunologic: Negative  Neurological: Negative  Hematological: Negative  Psychiatric/Behavioral: Negative  All other systems reviewed and are negative  Objective:      BP 98/70 (BP Location: Left arm, Patient Position: Sitting, Cuff Size: Standard)   Temp 98 9 °F (37 2 °C) (Tympanic)   Wt 39 2 kg (86 lb 6 4 oz)   BMI 15 80 kg/m²          Physical Exam   Constitutional: She is oriented to person, place, and time  She appears well-developed and well-nourished  HENT:   Head: Atraumatic  Right Ear: External ear normal    Left Ear: External ear normal    Serous TM effusion bilaterally with some left external canal tenderness   Eyes: Pupils are equal, round, and reactive to light  Conjunctivae and EOM are normal    Neck: Normal range of motion  Cardiovascular: Normal rate, regular rhythm and normal heart sounds  Pulmonary/Chest: Effort normal and breath sounds normal  No respiratory distress  Abdominal: Soft  She exhibits no distension  There is no tenderness  There is no rebound and no guarding  Musculoskeletal: Normal range of motion  Neurological: She is alert and oriented to person, place, and time  No cranial nerve deficit  Skin: Skin is warm and dry  Psychiatric: She has a normal mood and affect   Her behavior is normal  Judgment and thought content normal

## 2019-02-13 ENCOUNTER — OFFICE VISIT (OUTPATIENT)
Dept: FAMILY MEDICINE CLINIC | Facility: CLINIC | Age: 34
End: 2019-02-13
Payer: COMMERCIAL

## 2019-02-13 VITALS
HEIGHT: 62 IN | BODY MASS INDEX: 23 KG/M2 | SYSTOLIC BLOOD PRESSURE: 122 MMHG | WEIGHT: 125 LBS | DIASTOLIC BLOOD PRESSURE: 62 MMHG

## 2019-02-13 DIAGNOSIS — S29.019A THORACIC MYOFASCIAL STRAIN, INITIAL ENCOUNTER: Primary | ICD-10-CM

## 2019-02-13 DIAGNOSIS — S16.1XXA STRAIN OF NECK MUSCLE, INITIAL ENCOUNTER: ICD-10-CM

## 2019-02-13 PROCEDURE — 99213 OFFICE O/P EST LOW 20 MIN: CPT | Performed by: FAMILY MEDICINE

## 2019-02-13 PROCEDURE — 4004F PT TOBACCO SCREEN RCVD TLK: CPT | Performed by: FAMILY MEDICINE

## 2019-02-13 PROCEDURE — 3008F BODY MASS INDEX DOCD: CPT | Performed by: FAMILY MEDICINE

## 2019-02-13 RX ORDER — MELOXICAM 15 MG/1
15 TABLET ORAL DAILY
Qty: 30 TABLET | Refills: 1 | Status: SHIPPED | OUTPATIENT
Start: 2019-02-13 | End: 2019-07-29

## 2019-02-13 RX ORDER — PREDNISONE 10 MG/1
TABLET ORAL
Qty: 30 TABLET | Refills: 0 | Status: SHIPPED | OUTPATIENT
Start: 2019-02-13 | End: 2019-07-29

## 2019-02-13 RX ORDER — GABAPENTIN 100 MG/1
100 CAPSULE ORAL 3 TIMES DAILY
Qty: 90 CAPSULE | Refills: 1 | Status: SHIPPED | OUTPATIENT
Start: 2019-02-13 | End: 2019-07-29

## 2019-02-13 NOTE — PROGRESS NOTES
Assessment/Plan:  Patient will increase methocarbamol 3 times daily as directed  The patient will stop naproxen and start meloxicam daily the  Patient have prednisone taper  A follow-up next week     Diagnoses and all orders for this visit:    Thoracic myofascial strain, initial encounter  -     meloxicam (MOBIC) 15 mg tablet; Take 1 tablet (15 mg total) by mouth daily  -     predniSONE 10 mg tablet; 5 pills daily for the 2 days, 4 for 2 days, 3 for 2 days, 2 for 2 days, 1 for 2 days  -     gabapentin (NEURONTIN) 100 mg capsule; Take 1 capsule (100 mg total) by mouth 3 (three) times a day    Strain of neck muscle, initial encounter  -     meloxicam (MOBIC) 15 mg tablet; Take 1 tablet (15 mg total) by mouth daily  -     predniSONE 10 mg tablet; 5 pills daily for the 2 days, 4 for 2 days, 3 for 2 days, 2 for 2 days, 1 for 2 days  -     gabapentin (NEURONTIN) 100 mg capsule; Take 1 capsule (100 mg total) by mouth 3 (three) times a day          Subjective:      Patient ID: Gaston Vora is a 35 y o  female  Patient is here with right upper thoracic and posterior shoulder pain which began last Thursday  Patient has been rubbing the region  Patient has noticed pain has been from scapular over toward spine  Patient does have pain with lifting right arm  No left arm involvement  No leg involvement bilaterally  Normal urination defecation  Patient was seen at the emergency room roughly 2 days ago and was given Robaxin  Patient is still using naproxen  The patient was given Toradol shot in the ER  No trauma noted  Patient with radiation into the cervical region  Patient also using ice  Patient's pain is 8 at 10      The following portions of the patient's history were reviewed and updated as appropriate: allergies, current medications, past family history, past medical history, past social history, past surgical history and problem list     Review of Systems   Constitutional: Negative  HENT: Negative  Eyes: Negative  Respiratory: Negative  Cardiovascular: Negative  Gastrointestinal: Negative  Endocrine: Negative  Genitourinary: Negative  Musculoskeletal: Positive for arthralgias, back pain, neck pain and neck stiffness  Skin: Negative  Allergic/Immunologic: Negative  Neurological: Negative  Hematological: Negative  Psychiatric/Behavioral: Negative  Objective:      /62 (BP Location: Right arm)   Ht 5' 2" (1 575 m)   Wt 56 7 kg (125 lb)   BMI 22 86 kg/m²          Physical Exam   Constitutional: She is oriented to person, place, and time  She appears well-developed and well-nourished  No distress  HENT:   Head: Normocephalic  Right Ear: External ear normal    Left Ear: External ear normal    Mouth/Throat: Oropharynx is clear and moist  No oropharyngeal exudate  Eyes: Pupils are equal, round, and reactive to light  EOM are normal  Right eye exhibits no discharge  Left eye exhibits no discharge  No scleral icterus  Neck: Normal range of motion  Neck supple  No thyromegaly present  Cardiovascular: Normal rate, regular rhythm, normal heart sounds and intact distal pulses  Exam reveals no gallop and no friction rub  No murmur heard  Pulmonary/Chest: Effort normal and breath sounds normal  No respiratory distress  She has no wheezes  She has no rales  She exhibits no tenderness  Abdominal: Soft  Bowel sounds are normal  She exhibits no distension  There is no tenderness  There is no rebound and no guarding  Musculoskeletal: She exhibits tenderness  She exhibits no edema  Paravertebral muscle spasm in the right cervical region as well as the upper thoracic region on the right  Pain with palpation over this region  Patient with decrease neck rotation to the left to roughly 45° the right to 60° patient with decreased flexion extension secondary to pain  Lymphadenopathy:     She has no cervical adenopathy     Neurological: She is oriented to person, place, and time  No cranial nerve deficit  She exhibits normal muscle tone  Coordination normal    Skin: Skin is warm and dry  No rash noted  She is not diaphoretic  No erythema  No pallor  Psychiatric: She has a normal mood and affect   Her behavior is normal  Judgment and thought content normal

## 2019-07-29 ENCOUNTER — OFFICE VISIT (OUTPATIENT)
Dept: FAMILY MEDICINE CLINIC | Facility: CLINIC | Age: 34
End: 2019-07-29
Payer: COMMERCIAL

## 2019-07-29 VITALS
DIASTOLIC BLOOD PRESSURE: 68 MMHG | HEIGHT: 62 IN | TEMPERATURE: 99.2 F | SYSTOLIC BLOOD PRESSURE: 116 MMHG | BODY MASS INDEX: 24.29 KG/M2 | WEIGHT: 132 LBS

## 2019-07-29 DIAGNOSIS — H66.003 NON-RECURRENT ACUTE SUPPURATIVE OTITIS MEDIA OF BOTH EARS WITHOUT SPONTANEOUS RUPTURE OF TYMPANIC MEMBRANES: Primary | ICD-10-CM

## 2019-07-29 DIAGNOSIS — L72.0 EPIDERMOID CYST: ICD-10-CM

## 2019-07-29 PROCEDURE — 4004F PT TOBACCO SCREEN RCVD TLK: CPT | Performed by: FAMILY MEDICINE

## 2019-07-29 PROCEDURE — 3008F BODY MASS INDEX DOCD: CPT | Performed by: FAMILY MEDICINE

## 2019-07-29 PROCEDURE — 99213 OFFICE O/P EST LOW 20 MIN: CPT | Performed by: FAMILY MEDICINE

## 2019-07-29 RX ORDER — AMOXICILLIN AND CLAVULANATE POTASSIUM 875; 125 MG/1; MG/1
1 TABLET, FILM COATED ORAL EVERY 12 HOURS SCHEDULED
Qty: 14 TABLET | Refills: 0 | Status: SHIPPED | OUTPATIENT
Start: 2019-07-29 | End: 2019-08-05

## 2019-07-29 NOTE — PROGRESS NOTES
Assessment/Plan:  Patient use Augmentin twice daily for 1 week with food  Guidance given  Patient use heat as directed  Patient will see ENT if cystic structure does not resolve       Diagnoses and all orders for this visit:    Non-recurrent acute suppurative otitis media of both ears without spontaneous rupture of tympanic membranes  -     amoxicillin-clavulanate (AUGMENTIN) 875-125 mg per tablet; Take 1 tablet by mouth every 12 (twelve) hours for 7 days    Epidermoid cyst            Subjective:        Patient ID: Marlon Capellan is a 29 y o  female  Patient here with bilateral ear pain left side worse than right over the past month  Patient also notices cystic-like structure near pinna on the left  Patient also with mild coughing congestion  Patient with occasional fever and chills  Patient has use some ear drops bilaterally  The following portions of the patient's history were reviewed and updated as appropriate: allergies, current medications, past family history, past medical history, past social history, past surgical history and problem list       Review of Systems   Constitutional: Negative  HENT: Positive for congestion and ear pain  Negative for sore throat  Eyes: Negative  Respiratory: Positive for cough  Cardiovascular: Negative  Gastrointestinal: Negative  Endocrine: Negative  Genitourinary: Negative  Musculoskeletal: Negative  Skin: Negative  Allergic/Immunologic: Negative  Neurological: Negative  Hematological: Negative  Psychiatric/Behavioral: Negative  Objective:        /68 (BP Location: Right arm, Patient Position: Sitting, Cuff Size: Adult)   Temp 99 2 °F (37 3 °C) (Tympanic)   Ht 5' 2" (1 575 m)   Wt 59 9 kg (132 lb)   BMI 24 14 kg/m²          Physical Exam   Constitutional: She is oriented to person, place, and time  She appears well-developed and well-nourished  No distress  HENT:   Head: Normocephalic     Right Ear: External ear normal    Left Ear: External ear normal    Mouth/Throat: Oropharynx is clear and moist  No oropharyngeal exudate  TM red and bulging bilaterally  Eyes: Pupils are equal, round, and reactive to light  EOM are normal  Right eye exhibits no discharge  Left eye exhibits no discharge  No scleral icterus  Neck: Normal range of motion  Neck supple  No thyromegaly present  Cardiovascular: Normal rate, regular rhythm, normal heart sounds and intact distal pulses  Exam reveals no gallop and no friction rub  No murmur heard  Pulmonary/Chest: Effort normal and breath sounds normal  No respiratory distress  She has no wheezes  She has no rales  She exhibits no tenderness  Musculoskeletal: Normal range of motion  She exhibits no edema or tenderness  Lymphadenopathy:     She has no cervical adenopathy  Neurological: She is oriented to person, place, and time  No cranial nerve deficit  She exhibits normal muscle tone  Coordination normal    Skin: Skin is warm and dry  No rash noted  She is not diaphoretic  No erythema  No pallor  Cystic structure above pinna on the left   Psychiatric: She has a normal mood and affect   Her behavior is normal  Judgment and thought content normal

## 2020-07-14 ENCOUNTER — TELEMEDICINE (OUTPATIENT)
Dept: FAMILY MEDICINE CLINIC | Facility: CLINIC | Age: 35
End: 2020-07-14
Payer: COMMERCIAL

## 2020-07-14 VITALS — BODY MASS INDEX: 23 KG/M2 | HEIGHT: 62 IN | WEIGHT: 125 LBS

## 2020-07-14 DIAGNOSIS — Z20.828 EXPOSURE TO SARS-ASSOCIATED CORONAVIRUS: Primary | ICD-10-CM

## 2020-07-14 DIAGNOSIS — Z20.828 EXPOSURE TO SARS-ASSOCIATED CORONAVIRUS: ICD-10-CM

## 2020-07-14 PROCEDURE — 99214 OFFICE O/P EST MOD 30 MIN: CPT | Performed by: FAMILY MEDICINE

## 2020-07-14 PROCEDURE — U0003 INFECTIOUS AGENT DETECTION BY NUCLEIC ACID (DNA OR RNA); SEVERE ACUTE RESPIRATORY SYNDROME CORONAVIRUS 2 (SARS-COV-2) (CORONAVIRUS DISEASE [COVID-19]), AMPLIFIED PROBE TECHNIQUE, MAKING USE OF HIGH THROUGHPUT TECHNOLOGIES AS DESCRIBED BY CMS-2020-01-R: HCPCS

## 2020-07-14 PROCEDURE — 3008F BODY MASS INDEX DOCD: CPT | Performed by: FAMILY MEDICINE

## 2020-07-14 RX ORDER — ACETAMINOPHEN, ASPIRIN AND CAFFEINE 250; 250; 65 MG/1; MG/1; MG/1
1 TABLET, FILM COATED ORAL EVERY 6 HOURS PRN
COMMUNITY
End: 2022-03-11

## 2020-07-14 NOTE — PROGRESS NOTES
COVID-19 Virtual Visit     Assessment/Plan:    Problem List Items Addressed This Visit     None      Visit Diagnoses     Exposure to SARS-associated coronavirus    -  Primary    Relevant Orders    MISC COVID-19 TEST- Collected at [facility] or [facility]        This virtual check-in was done via [employer]  Disposition:      I referred Valeria Pickering to one of our centralized sites for a COVID-19 swab    I spent 15 minutes directly with the patient during this visit    Encounter provider Rona Soto DO    Provider located at [address] [place] [id number]    Recent Visits  No visits were found meeting these conditions  Showing recent visits within past 7 days and meeting all other requirements     Today's Visits  Date Type Provider Dept   07/14/20 Telemedicine Iwona Meeks DO [facility] [address] [facility] today's visits and meeting all other requirements     Future Appointments  Date Type Provider Dept   07/14/20 Telemedicine Iwona Meeks DO [id number]   Showing future appointments within next 150 days and meeting all other requirements        Patient agrees to participate in a virtual check in via telephone or video visit instead of presenting to the office to address urgent/immediate medical needs  Patient is aware this is a billable service  After connecting through [employer], the patient was identified by name and date of birth  Ashwini Level was informed that this was a telemedicine visit and that the exam was being conducted confidentially over secure lines  My office door was closed  No one else was in the room  Ashwini Level acknowledged consent and understanding of privacy and security of the telemedicine visit  I informed the patient that I have reviewed her record in Epic and presented the opportunity for her to ask any questions regarding the visit today  The patient agreed to participate  Demetrius Mina is a 28 y o  female who is concerned about COVID-19  She reports fever, cough, fatigue, anosmia, abdominal pain, diarrhea, nausea and vomiting  She has not experienced shortness of breath She has not had contact with a person who is under investigation for or who is positive for COVID-19 within the last 14 days  She has not been hospitalized recently for fever and/or lower respiratory symptoms  Past Medical History:   Diagnosis Date    Abdominal pain     Abnormal MRI, cervical spine     last assessed: 2016    Back pain, chronic     Chronic pain disorder     Constipation     Fatigue     Hemorrhoids     resolved: 2017    Loss of weight     Migraines     Nausea with vomiting     PONV (postoperative nausea and vomiting)     Tear of left rotator cuff     last assessed: 2016       Past Surgical History:   Procedure Laterality Date     SECTION      x 2    COLONOSCOPY N/A 2016    Procedure: COLONOSCOPY;  Surgeon: Daniel Reynoso MD;  Location: [facility]; Service:     ENDOMETRIAL CRYOABLATION      HERNIA REPAIR      HYSTERECTOMY      GA REVISE ULNAR NERVE AT ELBOW Left 2018    Procedure: RELEASE CUBITAL TUNNEL;  Surgeon: Janeth Gilliam MD;  Location:  MAIN OR;  Service: Orthopedics    GA REVISE ULNAR NERVE AT ELBOW Right 2018    Procedure: RELEASE CUBITAL TUNNEL;  Surgeon: Janeth Gilliam MD;  Location:  MAIN OR;  Service: Orthopedics    SHOULDER SURGERY Left     TUBAL LIGATION         Current Outpatient Medications   Medication Sig Dispense Refill    aspirin-acetaminophen-caffeine (EXCEDRIN MIGRAINE) 250-250-65 MG per tablet Take 1 tablet by mouth every 6 (six) hours as needed       No current facility-administered medications for this visit  No Known Allergies    Review of Systems   Constitutional: Positive for diaphoresis, fatigue and fever  HENT: Positive for postnasal drip and rhinorrhea   Negative for sore throat  Eyes: Negative  Respiratory: Positive for cough  Negative for shortness of breath  Cardiovascular: Negative  Negative for chest pain  Gastrointestinal: Positive for abdominal pain, nausea and vomiting  Endocrine: Negative  Genitourinary: Negative  Musculoskeletal: Negative  Skin: Negative  Allergic/Immunologic: Negative  Neurological: Negative  Hematological: Negative  Psychiatric/Behavioral: Negative  Video Exam    Vitals:    07/14/20 1431   Weight: 56 7 kg (125 lb)   Height: 5' 2" (1 575 m)         Adrien Love appears no distress  Physical Exam   Constitutional: She appears well-developed and well-nourished  No distress  HENT:   Head: Normocephalic and atraumatic  Right Ear: External ear normal    Left Ear: External ear normal    Cardiovascular: Normal rate and regular rhythm  Pulmonary/Chest: Effort normal  No respiratory distress  Neurological: She is alert  Skin: She is not diaphoretic  Psychiatric: She has a normal mood and affect  Her behavior is normal  Thought content normal         VIRTUAL VISIT [facility] acknowledges that she has consented to an online visit or consultation  She understands that the online visit is based solely on information provided by her, and that, in the absence of a face-to-face physical evaluation by the physician, the diagnosis she receives is both limited and provisional in terms of accuracy and completeness  This is not intended to replace a full medical face-to-face evaluation by the physician  Dougie Greer understands and accepts these terms

## 2020-07-19 LAB — SARS-COV-2 RNA SPEC QL NAA+PROBE: NOT DETECTED

## 2020-07-20 ENCOUNTER — TELEPHONE (OUTPATIENT)
Dept: FAMILY MEDICINE CLINIC | Facility: CLINIC | Age: 35
End: 2020-07-20

## 2020-07-20 NOTE — TELEPHONE ENCOUNTER
----- Message from Haven Avelar DO sent at 7/19/2020  7:50 PM EDT -----  Call patient    COVID test negative

## 2020-07-20 NOTE — TELEPHONE ENCOUNTER
Tried to call Pt to let her know COVID testing is WNL  Mailbox is full and I was unable to leave message       Please give results to her once she calls office

## 2021-02-03 ENCOUNTER — OFFICE VISIT (OUTPATIENT)
Dept: FAMILY MEDICINE CLINIC | Facility: CLINIC | Age: 36
End: 2021-02-03

## 2021-02-03 VITALS
DIASTOLIC BLOOD PRESSURE: 68 MMHG | SYSTOLIC BLOOD PRESSURE: 132 MMHG | BODY MASS INDEX: 24.11 KG/M2 | TEMPERATURE: 97.9 F | HEIGHT: 62 IN | WEIGHT: 131 LBS

## 2021-02-03 DIAGNOSIS — L73.9 FOLLICULITIS: ICD-10-CM

## 2021-02-03 DIAGNOSIS — R59.0 CERVICAL LYMPHADENOPATHY: Primary | ICD-10-CM

## 2021-02-03 DIAGNOSIS — R30.0 DYSURIA: ICD-10-CM

## 2021-02-03 DIAGNOSIS — L65.9 HAIR LOSS: ICD-10-CM

## 2021-02-03 LAB
BILIRUB UR QL STRIP: NEGATIVE
CLARITY UR: CLEAR
COLOR UR: YELLOW
GLUCOSE UR STRIP-MCNC: ABNORMAL MG/DL
HGB UR QL STRIP.AUTO: NEGATIVE
KETONES UR STRIP-MCNC: NEGATIVE MG/DL
LEUKOCYTE ESTERASE UR QL STRIP: NEGATIVE
NITRITE UR QL STRIP: NEGATIVE
PH UR STRIP.AUTO: 7 [PH]
PROT UR STRIP-MCNC: NEGATIVE MG/DL
SP GR UR STRIP.AUTO: 1.01 (ref 1–1.03)
UROBILINOGEN UR QL STRIP.AUTO: 0.2 E.U./DL

## 2021-02-03 PROCEDURE — 87086 URINE CULTURE/COLONY COUNT: CPT | Performed by: FAMILY MEDICINE

## 2021-02-03 PROCEDURE — 99214 OFFICE O/P EST MOD 30 MIN: CPT | Performed by: FAMILY MEDICINE

## 2021-02-03 PROCEDURE — 81003 URINALYSIS AUTO W/O SCOPE: CPT | Performed by: FAMILY MEDICINE

## 2021-02-03 RX ORDER — DOXYCYCLINE HYCLATE 100 MG
100 TABLET ORAL 2 TIMES DAILY
Qty: 42 TABLET | Refills: 0 | Status: SHIPPED | OUTPATIENT
Start: 2021-02-03 | End: 2021-02-24

## 2021-02-03 NOTE — PROGRESS NOTES
Assessment/Plan: patient will start doxycycline as directed  Patient go for laboratory studies  Biopsy may need to be obtained of lymph node  Patient will follow up in 3 weeks or as needed  If not seeing improvement patient will need to see Hematology Oncology verses ENT for further workup  Diagnoses and all orders for this visit:    Cervical lymphadenopathy  -     CBC and differential; Future  -     Comprehensive metabolic panel; Future  -     TSH, 3rd generation with Free T4 reflex; Future  -     C-reactive protein; Future  -     Lyme Antibody Profile with reflex to WB; Future  -     Ferritin; Future  -     TOXOPLASMOSIS ADULT PANEL (IGA ADELA/AC/HS/IGE); Future  -     EBV acute panel; Future  -     doxycycline hyclate (VIBRA-TABS) 100 mg tablet; Take 1 tablet (100 mg total) by mouth 2 (two) times a day for 21 days    Folliculitis  -     CBC and differential; Future  -     Comprehensive metabolic panel; Future  -     TSH, 3rd generation with Free T4 reflex; Future  -     C-reactive protein; Future  -     Lyme Antibody Profile with reflex to WB; Future  -     Ferritin; Future  -     TOXOPLASMOSIS ADULT PANEL (IGA ADELA/AC/HS/IGE); Future  -     EBV acute panel; Future  -     doxycycline hyclate (VIBRA-TABS) 100 mg tablet; Take 1 tablet (100 mg total) by mouth 2 (two) times a day for 21 days    Hair loss  -     CBC and differential; Future  -     Comprehensive metabolic panel; Future  -     TSH, 3rd generation with Free T4 reflex; Future  -     C-reactive protein; Future  -     Lyme Antibody Profile with reflex to WB; Future  -     Ferritin; Future  -     TOXOPLASMOSIS ADULT PANEL (IGA ADELA/AC/HS/IGE); Future  -     EBV acute panel; Future            Subjective:        Patient ID: Eunice Kimbrough is a 28 y o  female  Patient is here with left-sided neck mass/ possible lymph node over the past 3-4 weeks  Patient also does get swelling in the left inguinal region    Patient also with rash on face and neck   Patient has noticed some fatigue as well as night sweats  For months if not years  Patient also with hair loss  The patient did have hysterectomy 7 years ago  Ovaries intact  Patient also with increased urination  The patient with some rectal bleeding  Patient with stomach pain on the right  Patient eats 1 meal per day  No major weight loss noted  The following portions of the patient's history were reviewed and updated as appropriate: allergies, current medications, past family history, past medical history, past social history, past surgical history and problem list       Review of Systems   Constitutional: Positive for fatigue  Genitourinary: Positive for frequency  Negative for dysuria and hematuria  Skin: Positive for rash  Hair loss           Objective: Tobacco Cessation Counseling: Tobacco cessation counseling was provided  The patient is sincerely urged to quit consumption of tobacco  She is not ready to quit tobacco            /68 (BP Location: Right arm, Patient Position: Sitting, Cuff Size: Adult)   Temp 97 9 °F (36 6 °C) (Tympanic)   Ht 5' 2" (1 575 m)   Wt 59 4 kg (131 lb)   BMI 23 96 kg/m²          Physical Exam  Vitals signs and nursing note reviewed  Constitutional:       General: She is not in acute distress  Appearance: Normal appearance  She is not ill-appearing, toxic-appearing or diaphoretic  HENT:      Head: Normocephalic and atraumatic  Right Ear: Tympanic membrane, ear canal and external ear normal  There is no impacted cerumen  Left Ear: Tympanic membrane, ear canal and external ear normal  There is no impacted cerumen  Nose: Nose normal  No congestion or rhinorrhea  Mouth/Throat:      Mouth: Mucous membranes are moist       Pharynx: No oropharyngeal exudate or posterior oropharyngeal erythema  Eyes:      General: No scleral icterus  Right eye: No discharge  Left eye: No discharge        Extraocular Movements: Extraocular movements intact  Conjunctiva/sclera: Conjunctivae normal       Pupils: Pupils are equal, round, and reactive to light  Neck:      Musculoskeletal: Normal range of motion and neck supple  No neck rigidity or muscular tenderness  Vascular: No carotid bruit  Comments: Enlarged left posterior cervical chain lymph node along with left anterior chain lymph node  Patient with flu colitis on scalp as well as some cystic structures on face bilaterally  Cardiovascular:      Rate and Rhythm: Normal rate and regular rhythm  Pulses: Normal pulses  Heart sounds: Normal heart sounds  No murmur  No friction rub  No gallop  Pulmonary:      Effort: Pulmonary effort is normal  No respiratory distress  Breath sounds: Normal breath sounds  No stridor  No wheezing, rhonchi or rales  Chest:      Chest wall: No tenderness  Abdominal:      General: Abdomen is flat  Bowel sounds are normal  There is no distension  Palpations: Abdomen is soft  Tenderness: There is no abdominal tenderness  There is no guarding or rebound  Musculoskeletal: Normal range of motion  General: No swelling, tenderness, deformity or signs of injury  Right lower leg: No edema  Left lower leg: No edema  Lymphadenopathy:      Cervical: Cervical adenopathy present  Skin:     General: Skin is warm and dry  Capillary Refill: Capillary refill takes less than 2 seconds  Coloration: Skin is not jaundiced  Findings: Rash present  No bruising, erythema or lesion  Comments: Folliculitis the left scalp   Neurological:      General: No focal deficit present  Mental Status: She is alert and oriented to person, place, and time  Cranial Nerves: No cranial nerve deficit  Sensory: No sensory deficit  Motor: No weakness        Coordination: Coordination normal       Gait: Gait normal    Psychiatric:         Mood and Affect: Mood normal  Behavior: Behavior normal          Thought Content:  Thought content normal          Judgment: Judgment normal

## 2021-02-04 ENCOUNTER — APPOINTMENT (OUTPATIENT)
Dept: LAB | Facility: HOSPITAL | Age: 36
End: 2021-02-04

## 2021-02-04 DIAGNOSIS — L73.9 FOLLICULITIS: ICD-10-CM

## 2021-02-04 DIAGNOSIS — R59.0 CERVICAL LYMPHADENOPATHY: ICD-10-CM

## 2021-02-04 DIAGNOSIS — L65.9 HAIR LOSS: ICD-10-CM

## 2021-02-04 LAB
ALBUMIN SERPL BCP-MCNC: 4.3 G/DL (ref 3.5–5)
ALP SERPL-CCNC: 39 U/L (ref 46–116)
ALT SERPL W P-5'-P-CCNC: 26 U/L (ref 12–78)
ANION GAP SERPL CALCULATED.3IONS-SCNC: 7 MMOL/L (ref 4–13)
AST SERPL W P-5'-P-CCNC: 20 U/L (ref 5–45)
BACTERIA UR CULT: NORMAL
BASOPHILS # BLD AUTO: 0.07 THOUSANDS/ΜL (ref 0–0.1)
BASOPHILS NFR BLD AUTO: 1 % (ref 0–1)
BILIRUB SERPL-MCNC: 0.53 MG/DL (ref 0.2–1)
BUN SERPL-MCNC: 13 MG/DL (ref 5–25)
CALCIUM SERPL-MCNC: 9.1 MG/DL (ref 8.3–10.1)
CHLORIDE SERPL-SCNC: 104 MMOL/L (ref 100–108)
CO2 SERPL-SCNC: 29 MMOL/L (ref 21–32)
CREAT SERPL-MCNC: 0.77 MG/DL (ref 0.6–1.3)
CRP SERPL QL: 5.4 MG/L
EOSINOPHIL # BLD AUTO: 0.1 THOUSAND/ΜL (ref 0–0.61)
EOSINOPHIL NFR BLD AUTO: 1 % (ref 0–6)
ERYTHROCYTE [DISTWIDTH] IN BLOOD BY AUTOMATED COUNT: 13 % (ref 11.6–15.1)
FERRITIN SERPL-MCNC: 39 NG/ML (ref 8–388)
GFR SERPL CREATININE-BSD FRML MDRD: 100 ML/MIN/1.73SQ M
GLUCOSE SERPL-MCNC: 95 MG/DL (ref 65–140)
HCT VFR BLD AUTO: 47.8 % (ref 34.8–46.1)
HGB BLD-MCNC: 15.7 G/DL (ref 11.5–15.4)
IMM GRANULOCYTES # BLD AUTO: 0.01 THOUSAND/UL (ref 0–0.2)
IMM GRANULOCYTES NFR BLD AUTO: 0 % (ref 0–2)
LYMPHOCYTES # BLD AUTO: 2 THOUSANDS/ΜL (ref 0.6–4.47)
LYMPHOCYTES NFR BLD AUTO: 28 % (ref 14–44)
MCH RBC QN AUTO: 33.2 PG (ref 26.8–34.3)
MCHC RBC AUTO-ENTMCNC: 32.8 G/DL (ref 31.4–37.4)
MCV RBC AUTO: 101 FL (ref 82–98)
MONOCYTES # BLD AUTO: 0.57 THOUSAND/ΜL (ref 0.17–1.22)
MONOCYTES NFR BLD AUTO: 8 % (ref 4–12)
NEUTROPHILS # BLD AUTO: 4.33 THOUSANDS/ΜL (ref 1.85–7.62)
NEUTS SEG NFR BLD AUTO: 62 % (ref 43–75)
NRBC BLD AUTO-RTO: 0 /100 WBCS
PLATELET # BLD AUTO: 278 THOUSANDS/UL (ref 149–390)
PMV BLD AUTO: 9.6 FL (ref 8.9–12.7)
POTASSIUM SERPL-SCNC: 4.5 MMOL/L (ref 3.5–5.3)
PROT SERPL-MCNC: 7.7 G/DL (ref 6.4–8.2)
RBC # BLD AUTO: 4.73 MILLION/UL (ref 3.81–5.12)
SODIUM SERPL-SCNC: 140 MMOL/L (ref 136–145)
TSH SERPL DL<=0.05 MIU/L-ACNC: 1.42 UIU/ML (ref 0.36–3.74)
WBC # BLD AUTO: 7.08 THOUSAND/UL (ref 4.31–10.16)

## 2021-02-04 PROCEDURE — 36415 COLL VENOUS BLD VENIPUNCTURE: CPT

## 2021-02-04 PROCEDURE — 86664 EPSTEIN-BARR NUCLEAR ANTIGEN: CPT

## 2021-02-04 PROCEDURE — 84443 ASSAY THYROID STIM HORMONE: CPT

## 2021-02-04 PROCEDURE — 82728 ASSAY OF FERRITIN: CPT

## 2021-02-04 PROCEDURE — 80053 COMPREHEN METABOLIC PANEL: CPT

## 2021-02-04 PROCEDURE — 86140 C-REACTIVE PROTEIN: CPT

## 2021-02-04 PROCEDURE — 86618 LYME DISEASE ANTIBODY: CPT

## 2021-02-04 PROCEDURE — 86778 TOXOPLASMA ANTIBODY IGM: CPT

## 2021-02-04 PROCEDURE — 86777 TOXOPLASMA ANTIBODY: CPT

## 2021-02-04 PROCEDURE — 86665 EPSTEIN-BARR CAPSID VCA: CPT

## 2021-02-04 PROCEDURE — 86663 EPSTEIN-BARR ANTIBODY: CPT

## 2021-02-04 PROCEDURE — 85025 COMPLETE CBC W/AUTO DIFF WBC: CPT

## 2021-02-05 LAB
B BURGDOR IGG+IGM SER-ACNC: 13
CLINICAL COMMENT: NORMAL
EBV NA IGG SER IA-ACNC: 179 U/ML (ref 0–17.9)
EBV VCA IGG SER IA-ACNC: >600 U/ML (ref 0–17.9)
EBV VCA IGM SER IA-ACNC: <36 U/ML (ref 0–35.9)
INTERPRETATION: ABNORMAL
T GONDII IGG SERPL IA-ACNC: <3 IU/ML (ref 0–7.1)
T GONDII IGM SER IA-ACNC: <3 AU/ML (ref 0–7.9)

## 2021-02-10 ENCOUNTER — TELEPHONE (OUTPATIENT)
Dept: FAMILY MEDICINE CLINIC | Facility: CLINIC | Age: 36
End: 2021-02-10

## 2021-02-10 NOTE — TELEPHONE ENCOUNTER
Pt is wondering if the other lab results have come back  She is very anxious to find the results    Thank you

## 2021-02-11 ENCOUNTER — TELEPHONE (OUTPATIENT)
Dept: FAMILY MEDICINE CLINIC | Facility: CLINIC | Age: 36
End: 2021-02-11

## 2021-02-11 NOTE — TELEPHONE ENCOUNTER
Call patient    Patient's toxoplasmosis labs normal and Raul Bar virus shows old infection of mono in the past

## 2021-02-11 NOTE — TELEPHONE ENCOUNTER
Call patient  Labs reviewed  Labs are essentially normal overall with subtle increase in CRP which is for inflammation which is marginally borderline  Other tests normal other than Raul-Barr virus titer showing elevated numbers consistent with old infection, nothing new  This has  Nothing to do with her current situation  Patient had mononucleosis somewhere in the past probable distant past   See how patient is doing clinically if the lymph nodes or decreasing in size or still persisting    To consider CT scan of the neck soft tissue

## 2021-02-11 NOTE — TELEPHONE ENCOUNTER
Patient would like to know if everything is okay otherwise, as she saw labs that were red-flagged and she knows something is wrong  Please advise about what to tell pt to ease her concern  Thank you

## 2021-02-12 ENCOUNTER — OFFICE VISIT (OUTPATIENT)
Dept: FAMILY MEDICINE CLINIC | Facility: CLINIC | Age: 36
End: 2021-02-12
Payer: COMMERCIAL

## 2021-02-12 VITALS
DIASTOLIC BLOOD PRESSURE: 72 MMHG | WEIGHT: 131.6 LBS | TEMPERATURE: 96.9 F | SYSTOLIC BLOOD PRESSURE: 114 MMHG | BODY MASS INDEX: 24.22 KG/M2 | HEIGHT: 62 IN

## 2021-02-12 DIAGNOSIS — R59.0 CERVICAL ADENOPATHY: ICD-10-CM

## 2021-02-12 DIAGNOSIS — N64.4 BREAST PAIN: Primary | ICD-10-CM

## 2021-02-12 DIAGNOSIS — R10.84 GENERALIZED ABDOMINAL PAIN: ICD-10-CM

## 2021-02-12 DIAGNOSIS — R59.0 AXILLARY ADENOPATHY: ICD-10-CM

## 2021-02-12 DIAGNOSIS — R59.0 INGUINAL ADENOPATHY: ICD-10-CM

## 2021-02-12 DIAGNOSIS — R59.0 CERVICAL LYMPHADENOPATHY: ICD-10-CM

## 2021-02-12 PROCEDURE — 99214 OFFICE O/P EST MOD 30 MIN: CPT | Performed by: FAMILY MEDICINE

## 2021-02-12 NOTE — PROGRESS NOTES
Assessment/Plan: patient for diagnostic mammogram bilaterally  Patient have ultrasound done on left breast   Patient have CT scan of the neck, chest, abdomen and pelvis  Labs reviewed the patient  Patient will follow-up after all studies complete  To consider seeing breast specialist in further workup if needed  Patient will complete doxycycline course       Diagnoses and all orders for this visit:    Breast pain  -     Mammo diagnostic bilateral w cad; Future  -     US breast left limited (diagnostic); Future    Cervical lymphadenopathy  -     CT head neck chest abdomen pelvis w contrast; Future    Inguinal adenopathy  -     CT head neck chest abdomen pelvis w contrast; Future    Axillary adenopathy  -     CT head neck chest abdomen pelvis w contrast; Future    Generalized abdominal pain  -     CT head neck chest abdomen pelvis w contrast; Future    Cervical adenopathy  -     CT head neck chest abdomen pelvis w contrast; Future            Subjective:        Patient ID: Rosa Maria Mooney is a 28 y o  female  Patient follow-up on cervical adenopathy as well as having rash on face and right neck and now and axilla  Patient also noticed that her left breast lateral upper quadrant was more swollen recently  Patient does notice some swelling and left axilla  Labs reviewed with the patient  Patient does notice increased sweating  No fevers  Patient more tired than normal   Patient with some shortness of breath  Patient with some upper abdominal discomfort  The following portions of the patient's history were reviewed and updated as appropriate: allergies, current medications, past family history, past medical history, past social history, past surgical history and problem list       Review of Systems   Constitutional: Positive for fatigue  Negative for fever  HENT: Negative  Eyes: Negative  Respiratory: Positive for shortness of breath  Cardiovascular: Negative      Gastrointestinal: Positive for abdominal pain  Endocrine: Positive for heat intolerance  Genitourinary: Negative  Polyuria   Musculoskeletal: Positive for back pain  Skin: Negative  Allergic/Immunologic: Negative  Neurological: Negative  Hematological: Negative  Psychiatric/Behavioral: Positive for sleep disturbance  Objective:               /72 (BP Location: Right arm, Patient Position: Sitting, Cuff Size: Adult)   Temp (!) 96 9 °F (36 1 °C) (Tympanic)   Ht 5' 2" (1 575 m)   Wt 59 7 kg (131 lb 9 6 oz)   BMI 24 07 kg/m²          Physical Exam  Vitals signs and nursing note reviewed  Exam conducted with a chaperone present  Constitutional:       General: She is not in acute distress  Appearance: Normal appearance  She is not ill-appearing, toxic-appearing or diaphoretic  HENT:      Head: Normocephalic and atraumatic  Right Ear: Tympanic membrane, ear canal and external ear normal  There is no impacted cerumen  Left Ear: Tympanic membrane, ear canal and external ear normal  There is no impacted cerumen  Nose: Nose normal  No congestion or rhinorrhea  Mouth/Throat:      Mouth: Mucous membranes are moist       Pharynx: No oropharyngeal exudate or posterior oropharyngeal erythema  Eyes:      General: No scleral icterus  Right eye: No discharge  Left eye: No discharge  Extraocular Movements: Extraocular movements intact  Conjunctiva/sclera: Conjunctivae normal       Pupils: Pupils are equal, round, and reactive to light  Neck:      Musculoskeletal: Normal range of motion and neck supple  No neck rigidity or muscular tenderness  Vascular: No carotid bruit  Cardiovascular:      Rate and Rhythm: Normal rate and regular rhythm  Pulses: Normal pulses  Heart sounds: Normal heart sounds  No murmur  No friction rub  No gallop  Pulmonary:      Effort: Pulmonary effort is normal  No respiratory distress        Breath sounds: Normal breath sounds  No stridor  No wheezing, rhonchi or rales  Chest:      Chest wall: No tenderness  Abdominal:      General: Abdomen is flat  Bowel sounds are normal  There is no distension  Palpations: Abdomen is soft  Tenderness: There is no abdominal tenderness  There is no guarding or rebound  Musculoskeletal: Normal range of motion  General: No swelling, tenderness, deformity or signs of injury  Right lower leg: No edema  Left lower leg: No edema  Lymphadenopathy:      Cervical: No cervical adenopathy  Skin:     General: Skin is warm and dry  Capillary Refill: Capillary refill takes less than 2 seconds  Coloration: Skin is not jaundiced  Findings: No bruising, erythema, lesion or rash  Neurological:      General: No focal deficit present  Mental Status: She is alert and oriented to person, place, and time  Cranial Nerves: No cranial nerve deficit  Sensory: No sensory deficit  Motor: No weakness  Coordination: Coordination normal       Gait: Gait normal    Psychiatric:         Mood and Affect: Mood normal          Behavior: Behavior normal          Thought Content:  Thought content normal          Judgment: Judgment normal

## 2021-02-19 ENCOUNTER — HOSPITAL ENCOUNTER (OUTPATIENT)
Dept: ULTRASOUND IMAGING | Facility: CLINIC | Age: 36
Discharge: HOME | End: 2021-02-19
Payer: COMMERCIAL

## 2021-02-19 ENCOUNTER — HOSPITAL ENCOUNTER (OUTPATIENT)
Dept: MAMMOGRAPHY | Facility: CLINIC | Age: 36
Discharge: HOME | End: 2021-02-19
Payer: COMMERCIAL

## 2021-02-19 VITALS — HEIGHT: 62 IN | WEIGHT: 131 LBS | BODY MASS INDEX: 24.11 KG/M2

## 2021-02-19 DIAGNOSIS — N64.4 BREAST PAIN: ICD-10-CM

## 2021-02-19 PROCEDURE — G0279 TOMOSYNTHESIS, MAMMO: HCPCS

## 2021-02-19 PROCEDURE — 76642 ULTRASOUND BREAST LIMITED: CPT

## 2021-02-19 PROCEDURE — 77066 DX MAMMO INCL CAD BI: CPT

## 2021-02-20 ENCOUNTER — HOSPITAL ENCOUNTER (OUTPATIENT)
Dept: CT IMAGING | Facility: HOSPITAL | Age: 36
Discharge: HOME | End: 2021-02-20
Payer: COMMERCIAL

## 2021-02-20 DIAGNOSIS — R59.0 AXILLARY ADENOPATHY: ICD-10-CM

## 2021-02-20 DIAGNOSIS — R59.9 ADENOPATHY: ICD-10-CM

## 2021-02-20 DIAGNOSIS — R10.84 GENERALIZED ABDOMINAL PAIN: ICD-10-CM

## 2021-02-20 DIAGNOSIS — R59.0 INGUINAL ADENOPATHY: ICD-10-CM

## 2021-02-20 DIAGNOSIS — R59.0 CERVICAL ADENOPATHY: ICD-10-CM

## 2021-02-20 DIAGNOSIS — R59.0 CERVICAL LYMPHADENOPATHY: ICD-10-CM

## 2021-02-20 PROCEDURE — 70491 CT SOFT TISSUE NECK W/DYE: CPT

## 2021-02-20 PROCEDURE — 70450 CT HEAD/BRAIN W/O DYE: CPT

## 2021-02-20 PROCEDURE — 71260 CT THORAX DX C+: CPT

## 2021-02-20 PROCEDURE — 74177 CT ABD & PELVIS W/CONTRAST: CPT

## 2021-02-20 PROCEDURE — G1004 CDSM NDSC: HCPCS

## 2021-02-20 RX ADMIN — IOHEXOL 100 ML: 350 INJECTION, SOLUTION INTRAVENOUS at 07:47

## 2021-02-24 ENCOUNTER — OFFICE VISIT (OUTPATIENT)
Dept: FAMILY MEDICINE CLINIC | Facility: CLINIC | Age: 36
End: 2021-02-24
Payer: COMMERCIAL

## 2021-02-24 VITALS
TEMPERATURE: 97.5 F | DIASTOLIC BLOOD PRESSURE: 68 MMHG | BODY MASS INDEX: 24.66 KG/M2 | HEIGHT: 62 IN | WEIGHT: 134 LBS | SYSTOLIC BLOOD PRESSURE: 128 MMHG

## 2021-02-24 DIAGNOSIS — R59.0 CERVICAL LYMPHADENOPATHY: Primary | ICD-10-CM

## 2021-02-24 DIAGNOSIS — R59.0 CERVICAL ADENOPATHY: ICD-10-CM

## 2021-02-24 DIAGNOSIS — G93.5 CHIARI MALFORMATION TYPE I (HCC): ICD-10-CM

## 2021-02-24 DIAGNOSIS — R10.84 GENERALIZED ABDOMINAL PAIN: ICD-10-CM

## 2021-02-24 DIAGNOSIS — R59.0 INGUINAL ADENOPATHY: ICD-10-CM

## 2021-02-24 PROCEDURE — 99214 OFFICE O/P EST MOD 30 MIN: CPT | Performed by: FAMILY MEDICINE

## 2021-02-24 NOTE — PROGRESS NOTES
Assessment/Plan: patient with history of Chiari malformation  This is unchanged  Inguinal adenopathy better  Patient with ongoing cervical adenopathy  CT scans reviewed of the head, chest, neck and abdomen and pelvis  Patient will start Prilosec daily in the morning before eating  Patient see ENT due to cervical adenopathy  Patient will see GI for ongoing abdominal pain as well as hiatal hernia and bowel wall thickening noted on CT scan  Patient use Benadryl as needed for anxiety  Patient follow-up in 6-8 weeks  Diagnoses and all orders for this visit:    Cervical lymphadenopathy  -     Ambulatory Referral to Otolaryngology; Future    Chiari malformation type I ([facility] [address] )    Inguinal adenopathy    Cervical adenopathy    Generalized abdominal pain  -     Ambulatory referral to Gastroenterology; Future            Subjective:        Patient ID: Laury Mobley is a 28 y o  female  Patient here to follow-up on chest pain, abdominal pain, cervical as well as inguinal adenopathy in [place] [address] malformation  Patient ongoing chest pain and abdominal pain  Patient has notice some weight gain  Patient is drinking some wine  Patient does notice cervical adenopathy still persists in the left cervical region  Inguinal adenopathy on the left has improved  Patient completing doxycycline as we speak  Patient with significant sweating  The patient feels warm        The following portions of the patient's history were reviewed and updated as appropriate: allergies, current medications, past family history, past medical history, past social history, past surgical history and problem list       Review of Systems   Constitutional: Positive for diaphoresis and fatigue  HENT: Negative  Eyes: Negative  Respiratory: Negative  Cardiovascular: Positive for chest pain  Gastrointestinal: Positive for abdominal pain  Pain eating   Endocrine: Positive for heat intolerance  Genitourinary: Negative  Musculoskeletal: Negative  Skin: Positive for rash  Allergic/Immunologic: Negative  Neurological: Negative  Hematological: Negative  Psychiatric/Behavioral: Negative  Objective:        Depression Screening and Follow-up Plan: Clincally patient does not have depression  No treatment is required  /68 (BP Location: Right arm, Patient Position: Sitting, Cuff Size: Adult)   Temp 97 5 °F (36 4 °C) (Tympanic)   Ht 5' 2" (1 575 m)   Wt 60 8 kg (134 lb)   BMI 24 51 kg/m²          Physical Exam  Vitals signs and nursing note reviewed  Constitutional:       General: She is not in acute distress  Appearance: Normal appearance  She is not ill-appearing, toxic-appearing or diaphoretic  HENT:      Head: Normocephalic and atraumatic  Right Ear: Tympanic membrane, ear canal and external ear normal  There is no impacted cerumen  Left Ear: Tympanic membrane, ear canal and external ear normal  There is no impacted cerumen  Nose: Nose normal  No congestion or rhinorrhea  Mouth/Throat:      Mouth: Mucous membranes are moist       Pharynx: No oropharyngeal exudate or posterior oropharyngeal erythema  Eyes:      General: No scleral icterus  Right eye: No discharge  Left eye: No discharge  Extraocular Movements: Extraocular movements intact  Conjunctiva/sclera: Conjunctivae normal       Pupils: Pupils are equal, round, and reactive to light  Neck:      Musculoskeletal: Normal range of motion and neck supple  No neck rigidity or muscular tenderness  Vascular: No carotid bruit  Comments: Small lymph adenopathy bilaterally  Cardiovascular:      Rate and Rhythm: Normal rate and regular rhythm  Pulses: Normal pulses  Heart sounds: Normal heart sounds  No murmur  No friction rub  No gallop  Pulmonary:      Effort: Pulmonary effort is normal  No respiratory distress  Breath sounds: Normal breath sounds  No stridor  No wheezing, rhonchi or rales  Chest:      Chest wall: No tenderness  Abdominal:      General: Abdomen is flat  Bowel sounds are normal  There is no distension  Palpations: Abdomen is soft  Tenderness: There is abdominal tenderness  There is no guarding or rebound  Comments: Patient does have some abdominal pain diffusely worsen epigastric region  Patient with some ongoing right lower quadrant and left lower quadrant pain   Musculoskeletal: Normal range of motion  General: No swelling, tenderness, deformity or signs of injury  Right lower leg: No edema  Left lower leg: No edema  Lymphadenopathy:      Cervical: Cervical adenopathy present  Skin:     General: Skin is warm and dry  Capillary Refill: Capillary refill takes less than 2 seconds  Coloration: Skin is not jaundiced  Findings: No bruising, erythema, lesion or rash  Neurological:      General: No focal deficit present  Mental Status: She is alert and oriented to person, place, and time  Cranial Nerves: No cranial nerve deficit  Sensory: No sensory deficit  Motor: No weakness  Coordination: Coordination normal       Gait: Gait normal    Psychiatric:         Behavior: Behavior normal          Thought Content:  Thought content normal          Judgment: Judgment normal       Comments: Anxious

## 2021-03-08 ENCOUNTER — CONSULT (OUTPATIENT)
Dept: GASTROENTEROLOGY | Facility: CLINIC | Age: 36
End: 2021-03-08
Payer: COMMERCIAL

## 2021-03-08 VITALS
SYSTOLIC BLOOD PRESSURE: 120 MMHG | BODY MASS INDEX: 24.55 KG/M2 | DIASTOLIC BLOOD PRESSURE: 72 MMHG | TEMPERATURE: 98.1 F | HEART RATE: 96 BPM | WEIGHT: 133.4 LBS | HEIGHT: 62 IN

## 2021-03-08 DIAGNOSIS — K21.9 GASTROESOPHAGEAL REFLUX DISEASE WITHOUT ESOPHAGITIS: ICD-10-CM

## 2021-03-08 DIAGNOSIS — R13.19 ESOPHAGEAL DYSPHAGIA: ICD-10-CM

## 2021-03-08 DIAGNOSIS — K62.5 RECTAL BLEEDING: ICD-10-CM

## 2021-03-08 DIAGNOSIS — R10.84 GENERALIZED ABDOMINAL PAIN: ICD-10-CM

## 2021-03-08 DIAGNOSIS — K59.00 CONSTIPATION, UNSPECIFIED CONSTIPATION TYPE: Primary | ICD-10-CM

## 2021-03-08 DIAGNOSIS — K59.00 CONSTIPATION, UNSPECIFIED CONSTIPATION TYPE: ICD-10-CM

## 2021-03-08 DIAGNOSIS — R93.3 ABNORMAL CT SCAN, GASTROINTESTINAL TRACT: ICD-10-CM

## 2021-03-08 PROCEDURE — 99244 OFF/OP CNSLTJ NEW/EST MOD 40: CPT | Performed by: INTERNAL MEDICINE

## 2021-03-08 RX ORDER — PLECANATIDE 3 MG/1
1 TABLET ORAL DAILY
Qty: 30 TABLET | Refills: 5 | Status: SHIPPED | OUTPATIENT
Start: 2021-03-08 | End: 2021-03-09

## 2021-03-08 RX ORDER — SODIUM, POTASSIUM,MAG SULFATES 17.5-3.13G
177 SOLUTION, RECONSTITUTED, ORAL ORAL ONCE
Qty: 2 BOTTLE | Refills: 0 | Status: SHIPPED | OUTPATIENT
Start: 2021-03-08 | End: 2021-04-07

## 2021-03-08 NOTE — PATIENT INSTRUCTIONS
EGD/COLON scheduled on 4/13/21 with Dr Darby at [facility] pt verbal instructions/paper work given  [employer]

## 2021-03-09 RX ORDER — PLECANATIDE 3 MG/1
TABLET ORAL
Qty: 30 TABLET | Refills: 5 | Status: SHIPPED | OUTPATIENT
Start: 2021-03-09 | End: 2021-04-07

## 2021-03-11 NOTE — PROGRESS NOTES
[facility] - Outpatient Consultation  Santa Escamilla 28 y o  female MRN: [id number]  Encounter: [id number]      PCP: Amirah Monahan DO  Referring: DO Thierry Calloway [address] [address]      ASSESSMENT AND PLAN:      1  Generalized abdominal pain  2  Constipation, unspecified constipation type  Longstanding constipation, now with diarrhea, most consistent with overflow diarrhea  Risk factors include abdominal adhesions, endometriosis, surgical history, but may just be related to chronic idiopathic constipation/IBS-C  - trulance prescribed    3  Abnormal CT scan, gastrointestinal tract  - differential includes inflammatory bowel disease versus infectious gastroenteritis versus other  - Colonoscopy; Future, to evaluate for colonic inflammation, plan for biopsies, terminal ileum intubation, rule out microscopic colitis  - Na Sulfate-K Sulfate-Mg Sulf (Suprep Bowel Prep Kit) 17 5-3 13-1 6 GM/177ML SOLN; Take 177 mL by mouth once for 1 dose  Dispense: 2 Bottle; Refill: 0    4  Esophageal dysphagia  5  Gastroesophageal reflux disease without esophagitis  With pyloric thickening seen on CT scan which may be related to gastritis versus peptic ulcer disease versus other  - EGD; Future, to directly evaluate, with biopsies to rule out H pylori, celiac disease, assess for erosive gastritis/erosive GERD    6  Rectal bleeding  Differential as above, plan for colonoscopy to rule out lesions  - Colonoscopy; Future  - Na Sulfate-K Sulfate-Mg Sulf (Suprep Bowel Prep Kit) 17 5-3 13-1 6 GM/177ML SOLN; Take 177 mL by mouth once for 1 dose  Dispense: 2 Bottle;  Refill: 0      ______________________________________________________________________    CC:  Chief Complaint   Patient presents with    Abdominal Pain     burning sensation in abd     GERD    Dysphagia     solids, liquids     Change in Bowel Habits    Diarrhea    Rectal Bleeding       HPI:      Patient is a 72-year-old female patient referred for abdominal pain, GERD, dysphagia, change in bowel habits, diarrhea, rectal bleeding  She has hemorrhoidal disease, migraine headaches, Chiari malformation type 1, chronic otitis media, joint disease/rotator cuff tear, cervical/inguinal lymphadenopathy, chronic back pain, tobacco abuse  She complains of a several month history of left upper quadrant abdominal pain  Pain radiates to the back, and is constant  It is exacerbated by eating  She has associated gagging, and difficulty tolerating thick saliva  She has a change in her bowel habits, previously moving stools once weekly, and now up to four to 5 times a day  She has been having rectal bleeding, she notes that this is increased with stress  She describes associated heartburn, and burning epigastric pain that occurs in the morning  She has a history of endometriosis s/p diagnostic laparoscopies and C sections  She has limited response from miralax  She has been seen in the past by Dr Aniya Gomez last in 2016  She underwent colonoscopy which was within normal limits  Symptoms were concern to be related to constipation at that time  Recent CT demonstrated right-sided colitis, and pyloric wall thickening  REVIEW OF SYSTEMS:    CONSTITUTIONAL: Denies any fever, chills, rigors, and weight loss  HEENT: No earache or tinnitus  Denies hearing loss or visual disturbances  CARDIOVASCULAR: No chest pain or palpitations  RESPIRATORY: Denies any cough, hemoptysis, shortness of breath or dyspnea on exertion  GASTROINTESTINAL: As noted in the History of Present Illness  GENITOURINARY: No problems with urination  Denies any hematuria or dysuria  NEUROLOGIC: No dizziness or vertigo, denies headaches  MUSCULOSKELETAL: Denies any muscle or joint pain  SKIN: Denies skin rashes or itching  ENDOCRINE: Denies excessive thirst  Denies intolerance to heat or cold  PSYCHOSOCIAL: Denies depression or anxiety  Denies any recent memory loss  Historical Information   Past Medical History:   Diagnosis Date    Abdominal pain     Abnormal MRI, cervical spine     last assessed: 2016    Back pain, chronic     Chronic pain disorder     Constipation     Fatigue     Hemorrhoids     resolved: 2017    Loss of weight     Migraines     Nausea with vomiting     PONV (postoperative nausea and vomiting)     Tear of left rotator cuff     last assessed: 2016     Past Surgical History:   Procedure Laterality Date     SECTION      x 2    COLONOSCOPY N/A 2016    Procedure: COLONOSCOPY;  Surgeon: Viral Boyle MD;  Location: [facility];   Service:     ENDOMETRIAL CRYOABLATION      HERNIA REPAIR      HYSTERECTOMY      OH REVISE ULNAR NERVE AT ELBOW Left 2018    Procedure: RELEASE CUBITAL TUNNEL;  Surgeon: Edison Moreno MD;  Location: [facility];  Service: Orthopedics    OH REVISE ULNAR NERVE AT ELBOW Right 2018    Procedure: RELEASE CUBITAL TUNNEL;  Surgeon: Edison Moreno MD;  Location: [facility];  Service: Orthopedics    SHOULDER SURGERY Left     TUBAL LIGATION       Social History   Social History     Substance and Sexual Activity   Alcohol Use No     Social History     Substance and Sexual Activity   Drug Use No     Social History     Tobacco Use   Smoking Status Current Every Day Smoker    Packs/day: 0 25    Years: 15 00    Pack years: 3 75    Types: Cigarettes   Smokeless Tobacco Never Used   Tobacco Comment    encouraged smoking cessation     Family History   Problem Relation Age of Onset    Ovarian cancer Mother 36    Cancer Father         throat cancer    Alcohol abuse Father         alcoholism    Cirrhosis Father         hepatic    Throat cancer Father     Diabetes Maternal Grandfather         melliltus    No Known Problems Sister     No Known Problems Daughter     Ovarian cancer Maternal Grandmother     Melanoma Maternal Grandmother     Cervical cancer Maternal Grandmother  Colon cancer Maternal Grandmother     No Known Problems Paternal Grandmother     No Known Problems Paternal Grandfather     No Known Problems Son     No Known Problems Sister     No Known Problems Sister     No Known Problems Sister     No Known Problems Brother     No Known Problems Maternal Aunt     Breast cancer Maternal Aunt 45    No Known Problems Maternal Aunt     No Known Problems Maternal Uncle     No Known Problems Paternal Uncle     Breast cancer Other 48       Meds/Allergies       Current Outpatient Medications:     aspirin-acetaminophen-caffeine (EXCEDRIN MIGRAINE) 250-250-65 MG per tablet    Na Sulfate-K Sulfate-Mg Sulf (Suprep Bowel Prep Kit) 17 5-3 13-1 6 GM/177ML SOLN    Trulance 3 MG TABS    No Known Allergies        Objective     Blood pressure 120/72, pulse 96, temperature 98 1 °F (36 7 °C), temperature source Tympanic, height 5' 2" (1 575 m), weight 60 5 kg (133 lb 6 4 oz)  Body mass index is 24 4 kg/m²  PHYSICAL EXAM:      General Appearance:   Alert, cooperative, no distress   HEENT:   Normocephalic, atraumatic, anicteric  Neck:  Supple, symmetrical, trachea midline   Lungs:   Clear to auscultation bilaterally; no rales, rhonchi or wheezing; respirations unlabored    Heart[de-identified]   Regular rate and rhythm; no murmur, rub, or gallop     Abdomen:   Soft,   + generalized tenderness to palpation non-distended; normal bowel sounds; no masses, no organomegaly, lower abdominal pelvic adhesions palpated   Genitalia:   Deferred    Rectal:   Deferred    Extremities:  No cyanosis, clubbing or edema    Pulses:  2+ and symmetric    Skin:  No jaundice, rashes, or lesions    Lymph nodes:  No palpable cervical lymphadenopathy        Lab Results:     Lab Results   Component Value Date    WBC 7 08 02/04/2021    HGB 15 7 (H) 02/04/2021    HCT 47 8 (H) 02/04/2021     (H) 02/04/2021     02/04/2021       Lab Results   Component Value Date     08/09/2017    K 4 5 02/04/2021  02/04/2021    CO2 29 02/04/2021    ANIONGAP 16 07/09/2014    BUN 13 02/04/2021    CREATININE 0 77 02/04/2021    CALCIUM 9 1 02/04/2021    AST 20 02/04/2021    ALT 26 02/04/2021    ALKPHOS 39 (L) 02/04/2021    PROT 6 9 08/09/2017    BILITOT 0 5 08/09/2017    EGFR 100 02/04/2021       No results found for: INR, PROTIME      Radiology Results:   Ct Head Wo Contrast    Addendum Date: 2/20/2021 Addendum:   ADDENDUM: Upon further review of the examination, the cerebellar tonsils project below the foramen magnum  This appears similar to the prior MRI of March 21, 2018  Findings most compatible with Chiari I malformation versus cerebellar tonsillar ectopia  Result Date: 2/20/2021  Narrative: CT BRAIN - WITHOUT CONTRAST INDICATION:   R59 1: Generalized enlarged lymph nodes  Cervical adenopathy  COMPARISON:  MRI brain March 21, 2018 TECHNIQUE:  CT examination of the brain was performed  In addition to axial images, sagittal and coronal 2D reformatted images were created and submitted for interpretation  Radiation dose length product (DLP) for this visit:  781 mGy-cm   This examination, like all CT scans performed in the [facility], was performed utilizing techniques to minimize radiation dose exposure, including the use of iterative reconstruction and automated exposure control  IMAGE QUALITY:  Diagnostic  FINDINGS: PARENCHYMA:  No intracranial mass, mass effect or midline shift  No CT signs of acute infarction  No acute parenchymal hemorrhage  VENTRICLES AND EXTRA-AXIAL SPACES:  Normal for the patient's age  VISUALIZED ORBITS AND PARANASAL SINUSES:  Unremarkable  CALVARIUM AND EXTRACRANIAL SOFT TISSUES:  Normal      Impression: No acute intracranial abnormality  No significant change from prior MRI  Workstation performed: [id number]     Ct Soft Tissue Neck W Contrast    Result Date: 2/20/2021  Narrative: CT NECK WITH CONTRAST INDICATION:   R59 1: Generalized enlarged lymph nodes  Painful lymph nodes left neck  Cervical adenopathy  COMPARISON:  None  TECHNIQUE:  Axial, sagittal, and coronal 2D reformatted images were created from the axial source data and submitted for interpretation  Radiation dose length product (DLP) for this visit:  647 mGy-cm   This examination, like all CT scans performed in the [facility], was performed utilizing techniques to minimize radiation dose exposure, including the use of iterative reconstruction and automated exposure control  IV Contrast:  100 mL of iohexol (OMNIPAQUE) IMAGE QUALITY:  Diagnostic  FINDINGS: VISUALIZED BRAIN PARENCHYMA:  The cerebellar tonsils project below the foramen magnum, similar to the prior study, representing either cerebellar tonsillar ectopia versus Chiari I malformation  VISUALIZED ORBITS AND PARANASAL SINUSES:  Normal  NASAL CAVITY AND NASOPHARYNX:  There is prominence of the adenoidal tissues in the nasopharynx, similar to the prior study  There is minimal narrowing of the nasopharyngeal airway  SUPRAHYOID NECK:  Normal oral cavity, tongue base, tonsillar fossa and epiglottis  INFRAHYOID NECK:  Aryepiglottic folds and piriform sinuses are normal   Normal glottis and subglottic airway  THYROID GLAND:  Unremarkable  PAROTID AND SUBMANDIBULAR GLANDS:  Normal  LYMPH NODES: Prominent lymph nodes along the anterior and posterior cervical anton chains bilaterally as well as submental and submandibular regions bilaterally  No pathologic or enlarged adenopathy  VASCULAR STRUCTURES:  Normal enhancement of the cervical vasculature  THORACIC INLET:  Lung apices and upper mediastinum are unremarkable  BONY STRUCTURES: No acute fracture or destructive osseous lesion  Straightening and mild reversal of the cervical lordotic curvature  Mild disc space narrowing throughout the cervical spine, most pronounced C5-C6 with degenerative endplate changes  Periapical lucency surrounding the upper right 1st bicuspid (tooth #5)  There has been prior root canal   There is breach of the bony cortex anteriorly along the buccal surface  No subperiosteal abscess  Small periapical lucencies surrounding the upper right 2nd bicuspid  No breech of the bony cortex (tooth #4)  There are periapical lucencies surrounding the roots of the upper right 1st and 2nd molars (tooth #3 and #2 respectively)  There is breach of the bony cortex anteriorly along the buccal surface  No evidence of subperiosteal abscess  Impression: 1  Prominent cervical, submental and submandibular lymph nodes bilaterally  No pathologic adenopathy or soft tissue masses seen  2   Mild periodontal disease  3   Stable prominence of the adenoidal tissues in the nasopharynx, most compatible with adenoidal hypertrophy  Minimal narrowing of the nasopharyngeal airway  4   Cerebellar tonsils project below the foramen magnum, similar to the prior study, representing either cerebellar tonsillar ectopia versus Chiari I malformation  Workstation performed: [id number]     Ct Chest Abdomen Pelvis W Contrast    Result Date: 2/20/2021  Narrative: CT CHEST, ABDOMEN AND PELVIS WITH IV CONTRAST INDICATION:   R59 0: Localized enlarged lymph nodes R10 84: Generalized abdominal pain R59 0: Localized enlarged lymph nodes  Patient is a current, everyday smoker  COMPARISON:  None  TECHNIQUE: CT examination of the chest, abdomen and pelvis was performed  Axial, sagittal, and coronal 2D reformatted images were created from the source data and submitted for interpretation  Radiation dose length product (DLP) for this visit:  720 mGy-cm   This examination, like all CT scans performed in the [facility], was performed utilizing techniques to minimize radiation dose exposure, including the use of iterative reconstruction and automated exposure control  IV Contrast:  100 mL of iohexol (OMNIPAQUE) Enteric Contrast: Enteric contrast was administered   FINDINGS: CHEST LUNGS:  Lungs are well aerated  Mild subpleural emphysematous changes in the right upper lobe medially  Dependent atelectasis in the lower lobes bilaterally  PLEURA:  Unremarkable  HEART/GREAT VESSELS:  Unremarkable for patient's age  MEDIASTINUM AND RUY:  Unremarkable  CHEST WALL AND LOWER NECK:   There are bilateral silicone breast implants  ABDOMEN LIVER/BILIARY TREE:  Unremarkable  GALLBLADDER: Incompletely distended  No calcified gallstones  No pericholecystic inflammatory change  SPLEEN:  Unremarkable  PANCREAS:  Unremarkable  ADRENAL GLANDS:  Unremarkable  KIDNEYS/URETERS:  Unremarkable  No hydronephrosis  STOMACH AND BOWEL:  The stomach is incompletely distended with oral contrast material   Mild circumferential wall thickening in the pylorus  There is a hiatal hernia  No evidence of small bowel obstruction  The colon is segmentally distended with oral contrast material   There is mild circumferential wall thickening of the cecum, ascending colon and proximal transverse colon  APPENDIX:  No findings to suggest appendicitis  ABDOMINOPELVIC CAVITY:  No ascites  No pneumoperitoneum  No lymphadenopathy  VESSELS:  Unremarkable for patient's age  PELVIS REPRODUCTIVE ORGANS:  Surgically absent  URINARY BLADDER:  Unremarkable  ABDOMINAL WALL/INGUINAL REGIONS:  Unremarkable  OSSEOUS STRUCTURES:  No acute fracture or destructive osseous lesion  Impression: 1  Mild circumferential wall thickening in the pylorus  Although the findings may be due to peristalsis, mild gastritis not excluded  2   Mild circumferential wall thickening involving the cecum, ascending colon and proximal transverse colon  Although the findings may be due to under distention, mild colitis, either infectious or inflammatory, not excluded  Recommend follow-up GI consultation  The study was marked in EPIC for significant notification   Workstation performed: [id number]     Us Breast Left Limited (diagnostic), Mammo Diagnostic Bilateral W 3d & Cad    Result Date: 2/19/2021  Narrative: DIAGNOSIS: Breast pain TECHNIQUE: Digital diagnostic mammography was performed  Computer Aided Detection (CAD) analyzed all applicable images  Ultrasound of the left breast(s) was performed  COMPARISONS: None available  RELEVANT HISTORY: Family Breast Cancer History: History of breast cancer in Maternal Aunt, Other  Family Medical History: Family medical history includes breast cancer in 2 relatives (maternal aunt, other), colon cancer in maternal grandmother, and ovarian cancer in 2 relatives (maternal grandmother, mother)  Personal History: Hormone history includes birth control  Surgical history includes hysterectomy  No known relevant medical history  RISK ASSESSMENT: 5 Year Tyrer-Cuzick: 1 04 % 10 Year Tyrer-Cuzick: 2 55 % Lifetime Tyrer-Cuzick: 14 06 % TISSUE DENSITY: The breasts are heterogeneously dense, which may obscure small masses  INDICATION: Zoe Arellano is a 28 y o  female presenting for evaluation of left breast pain  FINDINGS: LEFT 1) MASS: There is an oval mass with circumscribed margins seen in the left breast at 12 o'clock, 4 cm from the nipple  Sonographic evaluation shows a corresponding oval circumscribed hypoechoic mass left breast 12:00 o'clock 4 cm from the nipple measuring 14 x 4 x 13 mm  Sq pain marker over the left breast upper outer quadrant has no subjacent mammographic abnormality  Sonographic evaluation of the area of pain was performed by scanning the 1-3 o'clock axis 11 cm from the nipple as well as the left axilla  There is no sonographic mass, distortion or adenopathy  Morphologically normal appearing left axillary lymph nodes are seen  Sonographic evaluation of the left breast lower inner quadrant was performed due to dense tissue on mammography  There is no sonographic mass or distortion  Elsewhere in the left breast on mammography, there are no suspicious masses, grouped microcalcifications or unexplained areas of architectural distortion   RIGHT There are no suspicious masses, grouped microcalcifications or areas of architectural distortion  The skin and nipple areolar complex are unremarkable  Bilateral subpectoral silicone implants are noted  Impression: 1  No mammographic or sonographic abnormality in patient's area of left breast pain  Recommend clinical management  2  Left breast 11:00 o'clock 13 mm oval circumscribed mass has a probably benign appearance  Recommend six-month follow-up left breast ultrasound  3  No mammographic evidence of malignancy in the right breast  I personally discussed these findings and recommendations with the patient  ASSESSMENT/BI-RADS CATEGORY: Left: 3 - Probably Benign Right: 2 - Benign Overall: 3 - Probably Benign RECOMMENDATION:      - Ultrasound in 6 months for the left breast       - Routine screening mammogram at age 36 for the right breast  Workstation ID: [id number]       Portions of the record may have been created with voice recognition software  Occasional wrong word or "sound a like" substitutions may have occurred due to the inherent limitations of voice recognition software  Read the chart carefully and recognize, using context, where substitutions have occurred

## 2021-03-18 ENCOUNTER — OFFICE VISIT (OUTPATIENT)
Dept: FAMILY MEDICINE CLINIC | Facility: CLINIC | Age: 36
End: 2021-03-18
Payer: COMMERCIAL

## 2021-03-18 VITALS
BODY MASS INDEX: 24.88 KG/M2 | SYSTOLIC BLOOD PRESSURE: 120 MMHG | WEIGHT: 135.2 LBS | DIASTOLIC BLOOD PRESSURE: 70 MMHG | TEMPERATURE: 97.5 F | HEIGHT: 62 IN

## 2021-03-18 DIAGNOSIS — L30.9 DERMATITIS: Primary | ICD-10-CM

## 2021-03-18 PROCEDURE — 99213 OFFICE O/P EST LOW 20 MIN: CPT | Performed by: FAMILY MEDICINE

## 2021-03-18 PROCEDURE — 3008F BODY MASS INDEX DOCD: CPT | Performed by: FAMILY MEDICINE

## 2021-03-18 PROCEDURE — 4004F PT TOBACCO SCREEN RCVD TLK: CPT | Performed by: FAMILY MEDICINE

## 2021-03-18 RX ORDER — PREDNISONE 10 MG/1
TABLET ORAL
Qty: 45 TABLET | Refills: 0 | Status: SHIPPED | OUTPATIENT
Start: 2021-03-18 | End: 2021-04-07

## 2021-03-18 NOTE — PROGRESS NOTES
Assessment/Plan: patient will be referred to dermatology  Patient will be placed on prednisone taper as directed  Patient use Benadryl as needed       Diagnoses and all orders for this visit:    Dermatitis  -     Ambulatory referral to Dermatology; Future  -     predniSONE 10 mg tablet; 5 pills daily for 3 days, 4 for 3 days, 3 for 3 days, 2 for 3 days, 1 for 3 days  Subjective:        Patient ID: Jon ny a 28 y o  female  Patient is here with rash diffusely on body over the past month or 2  Patient with mild itching and pain associated with this  Patient does sweat profusely  The patient is off all medications presently  No change in fabric softeners detergents shampoos soaps food etcetera  No treatment  Patient  Not smoking        The following portions of the patient's history were reviewed and updated as appropriate: allergies, current medications, past family history, past medical history, past social history, past surgical history and problem list       Review of Systems   Constitutional: Negative  HENT: Negative  Eyes: Negative  Respiratory: Negative  Cardiovascular: Negative  Gastrointestinal: Negative  Endocrine: Negative  Genitourinary: Negative  Musculoskeletal: Negative  Skin: Positive for color change and rash  Allergic/Immunologic: Negative  Neurological: Negative  Hematological: Negative  Psychiatric/Behavioral: Negative  Objective:               /70   Temp 97 5 °F (36 4 °C)   Ht 5' 2" (1 575 m)   Wt 61 3 kg (135 lb 3 2 oz)   BMI 24 73 kg/m²          Physical Exam  Constitutional:       Appearance: Normal appearance  Skin:     Findings: Rash present  Comments: Diffuse erythematous rash on body with some scale   Neurological:      Mental Status: She is alert  Mental status is at baseline  Psychiatric:         Mood and Affect: Mood normal          Behavior: Behavior normal          Thought Content:  Thought content normal

## 2021-04-07 ENCOUNTER — OFFICE VISIT (OUTPATIENT)
Dept: FAMILY MEDICINE CLINIC | Facility: CLINIC | Age: 36
End: 2021-04-07
Payer: COMMERCIAL

## 2021-04-07 VITALS
WEIGHT: 130.8 LBS | TEMPERATURE: 97.4 F | DIASTOLIC BLOOD PRESSURE: 62 MMHG | SYSTOLIC BLOOD PRESSURE: 110 MMHG | HEIGHT: 62 IN | BODY MASS INDEX: 24.07 KG/M2

## 2021-04-07 DIAGNOSIS — R59.0 INGUINAL ADENOPATHY: ICD-10-CM

## 2021-04-07 DIAGNOSIS — L30.9 DERMATITIS: ICD-10-CM

## 2021-04-07 DIAGNOSIS — R59.0 CERVICAL LYMPHADENOPATHY: ICD-10-CM

## 2021-04-07 DIAGNOSIS — G93.5 CHIARI MALFORMATION TYPE I (HCC): Primary | ICD-10-CM

## 2021-04-07 DIAGNOSIS — R59.0 AXILLARY ADENOPATHY: ICD-10-CM

## 2021-04-07 PROCEDURE — 99214 OFFICE O/P EST MOD 30 MIN: CPT | Performed by: FAMILY MEDICINE

## 2021-04-07 RX ORDER — MOMETASONE FUROATE 1 MG/G
CREAM TOPICAL DAILY
Qty: 45 G | Refills: 1 | Status: SHIPPED | OUTPATIENT
Start: 2021-04-07 | End: 2021-04-20

## 2021-04-07 NOTE — PROGRESS NOTES
Assessment/Plan:Will be seeing GI for EGD and colonoscopy on the 20th  Patient will be seeing Dermatology period patient has follow-up with ENT next month or 2  Patient follow-up appropriately for breast imaging for left breast pain  Patient use mometasone cream daily as directed  The patient to consider seeing General surgery for biopsy left inguinal region  Follow-up in 3 months or as needed       Diagnoses and all orders for this visit:    Chiari malformation type I ([facility] [address] )    Axillary adenopathy    Inguinal adenopathy  -     Ambulatory referral to General Surgery; Future    Cervical lymphadenopathy    Dermatitis  -     mometasone (ELOCON) 0 1 % cream; Apply topically daily            Subjective:        Patient ID: Shorty Kohli is a 28 y o  female  Patient is here to follow-up on multiple issues including axillary cervical and inguinal lymphadenopathy which persist   Patient did see ENT  Patient did see GI is going for EGD and colonoscopy on 20th period patient is following up with breast specialists appropriately for breast pain  Patient does have bruising/rash/ itching left buttocks the past few months  Dermatitis has improved but still persists the on arms and thighs  Patient has had night sweats for 8 years  No fevers  No other chest pain shortness of breath or change in urination or defecation  Patient does have abdominal pain and GERD symptoms frequently /daily  The following portions of the patient's history were reviewed and updated as appropriate: allergies, current medications, past family history, past medical history, past social history, past surgical history and problem list       Review of Systems   Gastrointestinal: Positive for abdominal pain  Skin: Positive for color change and rash  Hematological: Bruises/bleeds easily  Objective:        Depression Screening and Follow-up Plan: Clincally patient does not have depression  No treatment is required       Tobacco Cessation Counseling: Tobacco cessation counseling was provided  The patient is sincerely urged to quit consumption of tobacco  She is not ready to quit tobacco  Medication options discussed  /62 (BP Location: Right arm, Patient Position: Sitting, Cuff Size: Adult)   Temp (!) 97 4 °F (36 3 °C) (Tympanic)   Ht 5' 2" (1 575 m)   Wt 59 3 kg (130 lb 12 8 oz)   BMI 23 92 kg/m²          Physical Exam  Vitals signs and nursing note reviewed  Exam conducted with a chaperone present  Constitutional:       General: She is not in acute distress  Appearance: Normal appearance  She is not ill-appearing, toxic-appearing or diaphoretic  HENT:      Head: Normocephalic and atraumatic  Right Ear: Tympanic membrane, ear canal and external ear normal  There is no impacted cerumen  Left Ear: Tympanic membrane, ear canal and external ear normal  There is no impacted cerumen  Nose: Nose normal  No congestion or rhinorrhea  Mouth/Throat:      Mouth: Mucous membranes are moist       Pharynx: No oropharyngeal exudate or posterior oropharyngeal erythema  Eyes:      General: No scleral icterus  Right eye: No discharge  Left eye: No discharge  Extraocular Movements: Extraocular movements intact  Conjunctiva/sclera: Conjunctivae normal       Pupils: Pupils are equal, round, and reactive to light  Neck:      Musculoskeletal: Normal range of motion and neck supple  No neck rigidity or muscular tenderness  Vascular: No carotid bruit  Comments: Left posterior cervical adenopathy  Cardiovascular:      Rate and Rhythm: Normal rate and regular rhythm  Pulses: Normal pulses  Heart sounds: Normal heart sounds  No murmur  No friction rub  No gallop  Pulmonary:      Effort: Pulmonary effort is normal  No respiratory distress  Breath sounds: Normal breath sounds  No stridor  No wheezing, rhonchi or rales  Chest:      Chest wall: No tenderness  Abdominal:      General: Abdomen is flat  Bowel sounds are normal  There is no distension  Palpations: Abdomen is soft  Tenderness: There is no abdominal tenderness  There is no guarding or rebound  Comments: Left inguinal adenopathy   Musculoskeletal: Normal range of motion  General: No swelling, tenderness, deformity or signs of injury  Right lower leg: No edema  Left lower leg: No edema  Lymphadenopathy:      Cervical: Cervical adenopathy present  Skin:     General: Skin is warm and dry  Capillary Refill: Capillary refill takes less than 2 seconds  Coloration: Skin is not jaundiced  Findings: Rash present  No bruising, erythema or lesion  Comments: Residual rash on forearms  Patient also with crusting area x3 left buttocks  Neurological:      General: No focal deficit present  Mental Status: She is alert and oriented to person, place, and time  Cranial Nerves: No cranial nerve deficit  Sensory: No sensory deficit  Motor: No weakness  Coordination: Coordination normal       Gait: Gait normal    Psychiatric:         Mood and Affect: Mood normal          Behavior: Behavior normal          Thought Content:  Thought content normal          Judgment: Judgment normal

## 2021-04-19 ENCOUNTER — ANESTHESIA EVENT (OUTPATIENT)
Dept: GASTROENTEROLOGY | Facility: HOSPITAL | Age: 36
End: 2021-04-19

## 2021-04-20 ENCOUNTER — HOSPITAL ENCOUNTER (OUTPATIENT)
Dept: GASTROENTEROLOGY | Facility: HOSPITAL | Age: 36
Setting detail: OUTPATIENT SURGERY
Discharge: HOME | End: 2021-04-20
Attending: INTERNAL MEDICINE | Admitting: INTERNAL MEDICINE
Payer: COMMERCIAL

## 2021-04-20 ENCOUNTER — ANESTHESIA (OUTPATIENT)
Dept: GASTROENTEROLOGY | Facility: HOSPITAL | Age: 36
End: 2021-04-20

## 2021-04-20 VITALS
TEMPERATURE: 97 F | RESPIRATION RATE: 18 BRPM | WEIGHT: 130 LBS | DIASTOLIC BLOOD PRESSURE: 57 MMHG | OXYGEN SATURATION: 98 % | SYSTOLIC BLOOD PRESSURE: 97 MMHG | HEIGHT: 62 IN | BODY MASS INDEX: 23.92 KG/M2 | HEART RATE: 79 BPM

## 2021-04-20 DIAGNOSIS — K62.5 RECTAL BLEEDING: ICD-10-CM

## 2021-04-20 DIAGNOSIS — K21.9 GASTROESOPHAGEAL REFLUX DISEASE WITHOUT ESOPHAGITIS: ICD-10-CM

## 2021-04-20 DIAGNOSIS — R93.3 ABNORMAL CT SCAN, GASTROINTESTINAL TRACT: ICD-10-CM

## 2021-04-20 DIAGNOSIS — R13.19 ESOPHAGEAL DYSPHAGIA: ICD-10-CM

## 2021-04-20 PROBLEM — R11.2 PONV (POSTOPERATIVE NAUSEA AND VOMITING): Status: ACTIVE | Noted: 2021-04-20

## 2021-04-20 PROBLEM — Z98.890 PONV (POSTOPERATIVE NAUSEA AND VOMITING): Status: ACTIVE | Noted: 2021-04-20

## 2021-04-20 PROCEDURE — 43239 EGD BIOPSY SINGLE/MULTIPLE: CPT | Performed by: INTERNAL MEDICINE

## 2021-04-20 PROCEDURE — 45385 COLONOSCOPY W/LESION REMOVAL: CPT | Performed by: INTERNAL MEDICINE

## 2021-04-20 PROCEDURE — 88305 TISSUE EXAM BY PATHOLOGIST: CPT | Performed by: PATHOLOGY

## 2021-04-20 PROCEDURE — 45380 COLONOSCOPY AND BIOPSY: CPT | Performed by: INTERNAL MEDICINE

## 2021-04-20 RX ORDER — LIDOCAINE HYDROCHLORIDE 10 MG/ML
INJECTION, SOLUTION EPIDURAL; INFILTRATION; INTRACAUDAL; PERINEURAL AS NEEDED
Status: DISCONTINUED | OUTPATIENT
Start: 2021-04-20 | End: 2021-04-20

## 2021-04-20 RX ORDER — PROPOFOL 10 MG/ML
INJECTION, EMULSION INTRAVENOUS AS NEEDED
Status: DISCONTINUED | OUTPATIENT
Start: 2021-04-20 | End: 2021-04-20

## 2021-04-20 RX ORDER — PROPOFOL 10 MG/ML
INJECTION, EMULSION INTRAVENOUS CONTINUOUS PRN
Status: DISCONTINUED | OUTPATIENT
Start: 2021-04-20 | End: 2021-04-20

## 2021-04-20 RX ORDER — SODIUM CHLORIDE 9 MG/ML
75 INJECTION, SOLUTION INTRAVENOUS CONTINUOUS
Status: DISCONTINUED | OUTPATIENT
Start: 2021-04-20 | End: 2021-04-24 | Stop reason: HOSPADM

## 2021-04-20 RX ADMIN — PROPOFOL 50 MG: 10 INJECTION, EMULSION INTRAVENOUS at 14:06

## 2021-04-20 RX ADMIN — PROPOFOL 100 MCG/KG/MIN: 10 INJECTION, EMULSION INTRAVENOUS at 14:05

## 2021-04-20 RX ADMIN — PROPOFOL 150 MG: 10 INJECTION, EMULSION INTRAVENOUS at 13:48

## 2021-04-20 RX ADMIN — PROPOFOL 50 MG: 10 INJECTION, EMULSION INTRAVENOUS at 13:50

## 2021-04-20 RX ADMIN — PROPOFOL 50 MG: 10 INJECTION, EMULSION INTRAVENOUS at 13:55

## 2021-04-20 RX ADMIN — PROPOFOL 50 MG: 10 INJECTION, EMULSION INTRAVENOUS at 14:15

## 2021-04-20 RX ADMIN — LIDOCAINE HYDROCHLORIDE 50 MG: 10 INJECTION, SOLUTION EPIDURAL; INFILTRATION; INTRACAUDAL; PERINEURAL at 13:48

## 2021-04-20 RX ADMIN — SODIUM CHLORIDE: 0.9 INJECTION, SOLUTION INTRAVENOUS at 13:22

## 2021-04-20 RX ADMIN — PROPOFOL 50 MG: 10 INJECTION, EMULSION INTRAVENOUS at 14:13

## 2021-04-20 RX ADMIN — PROPOFOL 50 MG: 10 INJECTION, EMULSION INTRAVENOUS at 13:57

## 2021-04-20 NOTE — H&P
History and Physical - [facility]  Caitlin Gary 28 y o  female MRN: [id number]                  HPI: Caitlin Gary is a 28y o  year old female who presents for EGD/colonoscopy for evaluation of abdominal pain, GERD, dysphagia, change in bowel habits(constipation), rectal bleeding and recent CT scan showing right-sided colitis and pyloric wall thickening  Last colonoscopy in 2016 was normal       REVIEW OF SYSTEMS: Per the HPI, and otherwise unremarkable  Historical Information   Past Medical History:   Diagnosis Date    Abdominal pain     Abnormal MRI, cervical spine     last assessed: 2016    Back pain, chronic     Chronic pain disorder     Constipation     Fatigue     Hemorrhoids     resolved: 2017    Loss of weight     Migraines     Nausea with vomiting     PONV (postoperative nausea and vomiting)     Tear of left rotator cuff     last assessed: 2016     Past Surgical History:   Procedure Laterality Date     SECTION      x 2    COLONOSCOPY N/A 2016    Procedure: COLONOSCOPY;  Surgeon: Scott Whipple MD;  Location: [facility];   Service:     ENDOMETRIAL CRYOABLATION      HERNIA REPAIR      HYSTERECTOMY      ID REVISE ULNAR NERVE AT ELBOW Left 2018    Procedure: RELEASE CUBITAL TUNNEL;  Surgeon: Fercho Godoy MD;  Location:  MAIN OR;  Service: Orthopedics    ID REVISE ULNAR NERVE AT ELBOW Right 2018    Procedure: RELEASE CUBITAL TUNNEL;  Surgeon: Fercho Godoy MD;  Location:  MAIN OR;  Service: Orthopedics    SHOULDER SURGERY Left     TUBAL LIGATION       Social History   Social History     Substance and Sexual Activity   Alcohol Use No     Social History     Substance and Sexual Activity   Drug Use No     Social History     Tobacco Use   Smoking Status Current Every Day Smoker    Packs/day: 0 25    Years: 15 00    Pack years: 3 75    Types: Cigarettes   Smokeless Tobacco Never Used   Tobacco Comment encouraged smoking cessation     Family History   Problem Relation Age of Onset    Ovarian cancer Mother 36    Cancer Father         throat cancer    Alcohol abuse Father         alcoholism    Cirrhosis Father         hepatic    Throat cancer Father     Diabetes Maternal Grandfather         melliltus    No Known Problems Sister     No Known Problems Daughter     Ovarian cancer Maternal Grandmother     Melanoma Maternal Grandmother     Cervical cancer Maternal Grandmother     Colon cancer Maternal Grandmother     No Known Problems Paternal Grandmother     No Known Problems Paternal Grandfather     No Known Problems Son     No Known Problems Sister     No Known Problems Sister     No Known Problems Sister     No Known Problems Brother     No Known Problems Maternal Aunt     Breast cancer Maternal Aunt 45    No Known Problems Maternal Aunt     No Known Problems Maternal Uncle     No Known Problems Paternal Uncle     Breast cancer Other 48       Meds/Allergies     (Not in a hospital admission)      No Known Allergies    Objective     Height 5' 2" (1 575 m), weight 59 kg (130 lb), not currently breastfeeding  PHYSICAL EXAM    Gen: NAD  CV: RRR  CHEST: Clear  ABD: soft, NT/ND  EXT: no edema      ASSESSMENT/PLAN:  This is a 28y o  year old female here for EGD/colonoscopy for evaluation of abdominal pain, GERD, change in bowel movements, rectal bleeding      PLAN:   Procedure:  EGD/colonoscopy

## 2021-04-20 NOTE — DISCHARGE INSTRUCTIONS
Colonoscopy   WHAT YOU NEED TO KNOW:   A colonoscopy is a procedure to examine the inside of your colon (intestine) with a scope  Polyps or tissue growths may have been removed during your colonoscopy  It is normal to feel bloated and to have some abdominal discomfort  You should be passing gas  If you have hemorrhoids or you had polyps removed, you may have a small amount of bleeding  DISCHARGE INSTRUCTIONS:   Call your doctor if:   · You have a large amount of bright red blood in your bowel movements  · Your abdomen is hard and firm and you have severe pain  · You have sudden trouble breathing  · You develop a rash or hives  · You have a fever within 24 hours of your procedure  · You have not had a bowel movement for 3 days after your procedure  · You have questions or concerns about your condition or care  After your colonoscopy:   · Do not lift, strain, or run  for 3 days  · Rest as much as possible  You have been given medicine to relax you  Do not  drive or make important decisions for at least 24 hours  Return to your normal activity as directed  · Relieve gas and discomfort from bloating  by lying on your left side with a heating pad on your abdomen  You may need to take short walks to help the gas move out  Eat small meals until bloating is relieved  If you had polyps removed: For 7 days after your procedure:  · Do not  take aspirin  · Do not  go on long car rides  Help prevent constipation:   · Eat a variety of healthy foods  Healthy foods include fruit, vegetables, whole-grain breads, low-fat dairy products, beans, lean meat, and fish  Ask if you need to be on a special diet  Your healthcare provider may recommend that you eat high-fiber foods such as cooked beans  Fiber helps you have regular bowel movements  · Drink liquids as directed  Adults should drink between 9 and 13 eight-ounce cups of liquid every day  Ask what amount is best for you   For most people, good liquids to drink are water, juice, and milk  · Exercise as directed  Talk to your healthcare provider about the best exercise plan for you  Exercise can help prevent constipation, decrease your blood pressure and improve your health  Follow up with your healthcare provider as directed:  Write down your questions so you remember to ask them during your visits  © Copyright [address] Information is for End User's use only and may not be sold, redistributed or otherwise used for commercial purposes  All illustrations and images included in CareNotes® are the copyrighted property of A D [employer] M , Inc  or [facility] Fercho Jacobs   The above information is an  only  It is not intended as medical advice for individual conditions or treatments  Talk to your doctor, nurse or pharmacist before following any medical regimen to see if it is safe and effective for you  Upper Endoscopy   WHAT YOU NEED TO KNOW:   An upper endoscopy is also called an upper gastrointestinal (GI) endoscopy, or an esophagogastroduodenoscopy (EGD)  You may feel bloated, gassy, or have some abdominal discomfort after your procedure  Your throat may be sore for 24 to 36 hours  You may burp or pass gas from air that is still inside your body  DISCHARGE INSTRUCTIONS:   Call 911 for any of the following:   · You have sudden chest pain or trouble breathing  Seek care immediately if:   · You feel dizzy or faint  · You have trouble swallowing  · Your bowel movements are very dark or black  · Your abdomen is hard and firm and you have severe pain  · You vomit blood  Contact your healthcare provider if:   · You feel full or bloated and cannot burp or pass gas  · You have not had a bowel movement for 3 days after your procedure  · You have neck pain  · You have a fever or chills  · You have nausea or are vomiting  · You have a rash or hives      · You have questions or concerns about your endoscopy  Relieve a sore throat:  Suck on throat lozenges or crushed ice  Gargle with a small amount of warm salt water  Mix 1 teaspoon of salt and 1 cup of warm water to make salt water  Relieve gas and discomfort from bloating:  Lie on your right side with a heating pad on your abdomen  Take short walks to help pass gas  Eat small meals until bloating is relieved  Rest after your procedure: You have been given medicine to relax you  Do not  drive or make important decisions until the day after your procedure  Return to your normal activity as directed  You can usually return to work the day after your procedure  Follow up with your healthcare provider as directed:  Write down your questions so you remember to ask them during your visits  © 2017 [address] is for End User's use only and may not be sold, redistributed or otherwise used for commercial purposes  All illustrations and images included in CareNotes® are the copyrighted property of A D [employer] M [employer] Inc  or Deni Dhillon  The above information is an  only  It is not intended as medical advice for individual conditions or treatments  Talk to your doctor, nurse or pharmacist before following any medical regimen to see if it is safe and effective for you

## 2021-04-20 NOTE — ANESTHESIA PREPROCEDURE EVALUATION
Procedure:  COLONOSCOPY  EGD    Relevant Problems   CARDIO   (+) Breast pain   (+) Migraine with aura and without status migrainosus      MUSCULOSKELETAL   (+) Cervical strain   (+) Thoracic myofascial strain      NEURO/PSYCH   (+) Anxiety      Other   (+) Axillary adenopathy   (+) Cervical adenopathy   (+) Cervical lymphadenopathy   (+) Chiari malformation type I (HCC)   (+) Inguinal adenopathy        Physical Exam    Airway    Mallampati score: I  TM Distance: >3 FB  Neck ROM: full     Dental   No notable dental hx     Cardiovascular  Cardiovascular exam normal    Pulmonary  Pulmonary exam normal     Other Findings        Anesthesia Plan  ASA Score- 3     Anesthesia Type- IV sedation with anesthesia with ASA Monitors  Additional Monitors:   Airway Plan:           Plan Factors-Exercise tolerance (METS): >4 METS  Chart reviewed  Patient is a current smoker  Patient instructed to abstain from smoking on day of procedure  Patient smoked on day of surgery  Induction-     Postoperative Plan-     Informed Consent- Anesthetic plan and risks discussed with patient  I personally reviewed this patient with the CRNA  Discussed and agreed on the Anesthesia Plan with the CRNA  Rachel Tubbs

## 2021-04-20 NOTE — NURSING NOTE
Pt seen and instructed by Dr Spenser Galvan  Written and verbal instructions given, pt verbalizes an understanding and voices no questions or complaints  Pt tolerated diet

## 2021-04-23 NOTE — ANESTHESIA POSTPROCEDURE EVALUATION
Post-Op Assessment Note    CV Status:  Stable    Pain management: adequate     Mental Status:  Awake   PONV Controlled:  None   Airway Patency:  Patent       Post Op Vitals Reviewed: No      Staff: Anesthesiologist         No complications documented

## 2021-04-27 ENCOUNTER — CONSULT (OUTPATIENT)
Dept: SURGERY | Facility: CLINIC | Age: 36
End: 2021-04-27
Payer: COMMERCIAL

## 2021-04-27 VITALS
WEIGHT: 131.6 LBS | BODY MASS INDEX: 24.22 KG/M2 | HEIGHT: 62 IN | SYSTOLIC BLOOD PRESSURE: 121 MMHG | RESPIRATION RATE: 16 BRPM | TEMPERATURE: 97.5 F | HEART RATE: 90 BPM | DIASTOLIC BLOOD PRESSURE: 78 MMHG

## 2021-04-27 DIAGNOSIS — R59.0 INGUINAL ADENOPATHY: ICD-10-CM

## 2021-04-27 DIAGNOSIS — R59.0 CERVICAL LYMPHADENOPATHY: Primary | ICD-10-CM

## 2021-04-27 PROCEDURE — 99243 OFF/OP CNSLTJ NEW/EST LOW 30: CPT | Performed by: SURGERY

## 2021-04-27 PROCEDURE — 4004F PT TOBACCO SCREEN RCVD TLK: CPT | Performed by: SURGERY

## 2021-04-27 PROCEDURE — 3008F BODY MASS INDEX DOCD: CPT | Performed by: SURGERY

## 2021-04-27 NOTE — PROGRESS NOTES
Assessment/Plan:    Cervical lymphadenopathy    I reviewed the CT scans of the head and neck and chest abdomen pelvis and there is no pathologic adenopathy noted  There is some mild prominence on the left posterior chain but this can often be reactive to sinus repaired onto the disease  On exam I do not appreciate any pathologic adenopathy  The only 1 node that is easily palpable is her posterior chain of her neck but is only a few mm and these can be difficult to excise the office due to location and small size  I tried to reassure her that there is no evidence of lymphoma at this time and there is no need for any further intervention  I recommend she follow up with Dermatology regarding the skin lesions  I did say that if she still is perseverating about the lymph node that she can return and we can attempt to remove it in the office  Diagnoses and all orders for this visit:    Cervical lymphadenopathy    Inguinal adenopathy  -     Ambulatory referral to General Surgery          Subjective:      Patient ID: Federica Andrea is a 28 y o  female  Ms Burton King  Is a 77-year-old female that was referred for evaluation of lymphadenopathy  Patient states she has multiple complaints that have started over the last several months  She complains of night sweats which she soaks through a towel  She is concerned that she might be starting early menopause  She states she is allergic to the son and tends all the time her Dermatology  She has some rash spots that have popped up and have not resolved and she does not believe this is related to the son  She also has had some increase in some small skin cysts or acne  She also notice a small lump in back of her neck on the left side and also in her groins  She feels a can fluctuate in size  She is most concerned about the 1 to left back of her neck and she is concerned that she might have lymphoma        The following portions of the patient's history were reviewed and updated as appropriate: allergies, current medications, past family history, past medical history, past social history, past surgical history and problem list     Review of Systems   Constitutional: Negative for chills and fever  HENT: Positive for trouble swallowing  Negative for ear pain and sore throat  Eyes: Negative for pain and visual disturbance  Respiratory: Negative for cough and shortness of breath  Cardiovascular: Negative for chest pain and palpitations  Gastrointestinal: Negative for abdominal pain and vomiting  Genitourinary: Negative for dysuria and hematuria  Musculoskeletal: Negative for arthralgias and back pain  Skin: Negative for color change and rash  Neurological: Negative for seizures and syncope  Psychiatric/Behavioral: Negative for agitation  All other systems reviewed and are negative  Objective:      /78   Pulse 90   Temp 97 5 °F (36 4 °C)   Resp 16   Ht 5' 2" (1 575 m)   Wt 59 7 kg (131 lb 9 6 oz)   BMI 24 07 kg/m²          Physical Exam  Vitals signs and nursing note reviewed  Constitutional:       General: She is not in acute distress  Appearance: She is well-developed  She is not diaphoretic  HENT:      Head: Normocephalic and atraumatic  Eyes:      Pupils: Pupils are equal, round, and reactive to light  Neck:      Musculoskeletal: Normal range of motion and neck supple  Cardiovascular:      Rate and Rhythm: Normal rate and regular rhythm  Heart sounds: Normal heart sounds  No murmur  Pulmonary:      Effort: Pulmonary effort is normal  No respiratory distress  Breath sounds: Normal breath sounds  No wheezing  Abdominal:      General: Bowel sounds are normal       Palpations: Abdomen is soft  Musculoskeletal: Normal range of motion  Skin:     General: Skin is warm and dry  Comments:   Left neck posterior  Lymph chain there is a palpable 3-4 mm smooth mobile likely lymph node    On examination of bilateral groins there are some normal appearing small lymph nodes  Neurological:      Mental Status: She is alert and oriented to person, place, and time     Psychiatric:         Behavior: Behavior normal

## 2021-04-27 NOTE — ASSESSMENT & PLAN NOTE
I reviewed the CT scans of the head and neck and chest abdomen pelvis and there is no pathologic adenopathy noted  There is some mild prominence on the left posterior chain but this can often be reactive to sinus repaired onto the disease  On exam I do not appreciate any pathologic adenopathy  The only 1 node that is easily palpable is her posterior chain of her neck but is only a few mm and these can be difficult to excise the office due to location and small size  I tried to reassure her that there is no evidence of lymphoma at this time and there is no need for any further intervention  I recommend she follow up with Dermatology regarding the skin lesions  I did say that if she still is perseverating about the lymph node that she can return and we can attempt to remove it in the office

## 2021-04-29 ENCOUNTER — TELEPHONE (OUTPATIENT)
Dept: GASTROENTEROLOGY | Facility: CLINIC | Age: 36
End: 2021-04-29

## 2021-11-11 ENCOUNTER — TELEPHONE (OUTPATIENT)
Dept: FAMILY MEDICINE CLINIC | Facility: CLINIC | Age: 36
End: 2021-11-11

## 2021-11-11 DIAGNOSIS — N63.20 LEFT BREAST MASS: Primary | ICD-10-CM

## 2021-11-11 DIAGNOSIS — R55 VASOVAGAL SYNCOPE: ICD-10-CM

## 2021-11-11 DIAGNOSIS — R59.0 AXILLARY ADENOPATHY: ICD-10-CM

## 2021-11-11 DIAGNOSIS — N64.4 BREAST PAIN: ICD-10-CM

## 2021-11-11 DIAGNOSIS — R59.0 INGUINAL ADENOPATHY: ICD-10-CM

## 2021-11-11 DIAGNOSIS — L65.9 HAIR LOSS: ICD-10-CM

## 2021-11-11 DIAGNOSIS — G93.5 CHIARI MALFORMATION TYPE I (HCC): Primary | ICD-10-CM

## 2021-11-12 ENCOUNTER — APPOINTMENT (OUTPATIENT)
Dept: LAB | Facility: HOSPITAL | Age: 36
End: 2021-11-12
Payer: COMMERCIAL

## 2021-11-12 DIAGNOSIS — R55 VASOVAGAL SYNCOPE: ICD-10-CM

## 2021-11-12 DIAGNOSIS — G93.5 CHIARI MALFORMATION TYPE I (HCC): ICD-10-CM

## 2021-11-12 DIAGNOSIS — L65.9 HAIR LOSS: ICD-10-CM

## 2021-11-12 DIAGNOSIS — N64.4 BREAST PAIN: ICD-10-CM

## 2021-11-12 DIAGNOSIS — R59.0 AXILLARY ADENOPATHY: ICD-10-CM

## 2021-11-12 DIAGNOSIS — R59.0 INGUINAL ADENOPATHY: ICD-10-CM

## 2021-11-12 LAB
ALBUMIN SERPL BCP-MCNC: 4.1 G/DL (ref 3.5–5)
ALP SERPL-CCNC: 48 U/L (ref 46–116)
ALT SERPL W P-5'-P-CCNC: 36 U/L (ref 12–78)
ANION GAP SERPL CALCULATED.3IONS-SCNC: 10 MMOL/L (ref 4–13)
AST SERPL W P-5'-P-CCNC: 22 U/L (ref 5–45)
BASOPHILS # BLD AUTO: 0.08 THOUSANDS/ΜL (ref 0–0.1)
BASOPHILS NFR BLD AUTO: 1 % (ref 0–1)
BILIRUB SERPL-MCNC: 0.74 MG/DL (ref 0.2–1)
BUN SERPL-MCNC: 13 MG/DL (ref 5–25)
CALCIUM SERPL-MCNC: 9 MG/DL (ref 8.3–10.1)
CHLORIDE SERPL-SCNC: 102 MMOL/L (ref 100–108)
CHOLEST SERPL-MCNC: 202 MG/DL (ref 50–200)
CO2 SERPL-SCNC: 27 MMOL/L (ref 21–32)
CREAT SERPL-MCNC: 0.92 MG/DL (ref 0.6–1.3)
EOSINOPHIL # BLD AUTO: 0.08 THOUSAND/ΜL (ref 0–0.61)
EOSINOPHIL NFR BLD AUTO: 1 % (ref 0–6)
ERYTHROCYTE [DISTWIDTH] IN BLOOD BY AUTOMATED COUNT: 12.4 % (ref 11.6–15.1)
GFR SERPL CREATININE-BSD FRML MDRD: 80 ML/MIN/1.73SQ M
GLUCOSE P FAST SERPL-MCNC: 135 MG/DL (ref 65–99)
HCT VFR BLD AUTO: 44.1 % (ref 34.8–46.1)
HDLC SERPL-MCNC: 78 MG/DL
HGB BLD-MCNC: 14.5 G/DL (ref 11.5–15.4)
IMM GRANULOCYTES # BLD AUTO: 0.02 THOUSAND/UL (ref 0–0.2)
IMM GRANULOCYTES NFR BLD AUTO: 0 % (ref 0–2)
LDLC SERPL CALC-MCNC: 117 MG/DL (ref 0–100)
LYMPHOCYTES # BLD AUTO: 1.75 THOUSANDS/ΜL (ref 0.6–4.47)
LYMPHOCYTES NFR BLD AUTO: 31 % (ref 14–44)
MCH RBC QN AUTO: 33.1 PG (ref 26.8–34.3)
MCHC RBC AUTO-ENTMCNC: 32.9 G/DL (ref 31.4–37.4)
MCV RBC AUTO: 101 FL (ref 82–98)
MONOCYTES # BLD AUTO: 0.67 THOUSAND/ΜL (ref 0.17–1.22)
MONOCYTES NFR BLD AUTO: 12 % (ref 4–12)
NEUTROPHILS # BLD AUTO: 3.11 THOUSANDS/ΜL (ref 1.85–7.62)
NEUTS SEG NFR BLD AUTO: 55 % (ref 43–75)
NONHDLC SERPL-MCNC: 124 MG/DL
NRBC BLD AUTO-RTO: 0 /100 WBCS
PLATELET # BLD AUTO: 260 THOUSANDS/UL (ref 149–390)
PMV BLD AUTO: 9.6 FL (ref 8.9–12.7)
POTASSIUM SERPL-SCNC: 4.2 MMOL/L (ref 3.5–5.3)
PROT SERPL-MCNC: 7.1 G/DL (ref 6.4–8.2)
RBC # BLD AUTO: 4.38 MILLION/UL (ref 3.81–5.12)
SODIUM SERPL-SCNC: 139 MMOL/L (ref 136–145)
TRIGL SERPL-MCNC: 33 MG/DL
TSH SERPL DL<=0.05 MIU/L-ACNC: 1.27 UIU/ML (ref 0.36–3.74)
WBC # BLD AUTO: 5.71 THOUSAND/UL (ref 4.31–10.16)

## 2021-11-12 PROCEDURE — 80061 LIPID PANEL: CPT

## 2021-11-12 PROCEDURE — 84443 ASSAY THYROID STIM HORMONE: CPT

## 2021-11-12 PROCEDURE — 36415 COLL VENOUS BLD VENIPUNCTURE: CPT

## 2021-11-12 PROCEDURE — 80053 COMPREHEN METABOLIC PANEL: CPT

## 2021-11-12 PROCEDURE — 85025 COMPLETE CBC W/AUTO DIFF WBC: CPT

## 2022-01-12 ENCOUNTER — TELEPHONE (OUTPATIENT)
Dept: DERMATOLOGY | Facility: CLINIC | Age: 37
End: 2022-01-12

## 2022-01-12 NOTE — TELEPHONE ENCOUNTER
Called pt to offer sooner appt with Dr Andrew Alanis in CV office tomorrow  LVM for pt to call back if interested

## 2022-02-01 ENCOUNTER — HOSPITAL ENCOUNTER (OUTPATIENT)
Dept: MAMMOGRAPHY | Facility: CLINIC | Age: 37
Discharge: HOME | End: 2022-02-01
Payer: COMMERCIAL

## 2022-02-01 ENCOUNTER — HOSPITAL ENCOUNTER (OUTPATIENT)
Dept: ULTRASOUND IMAGING | Facility: CLINIC | Age: 37
Discharge: HOME | End: 2022-02-01
Payer: COMMERCIAL

## 2022-02-01 DIAGNOSIS — R92.8 ABNORMAL ULTRASOUND OF BREAST: ICD-10-CM

## 2022-02-01 DIAGNOSIS — Z09 FOLLOW-UP EXAM, 3-6 MONTHS SINCE PREVIOUS EXAM: ICD-10-CM

## 2022-02-01 PROCEDURE — 76642 ULTRASOUND BREAST LIMITED: CPT

## 2022-03-11 ENCOUNTER — OFFICE VISIT (OUTPATIENT)
Dept: FAMILY MEDICINE CLINIC | Facility: CLINIC | Age: 37
End: 2022-03-11
Payer: COMMERCIAL

## 2022-03-11 VITALS
OXYGEN SATURATION: 97 % | DIASTOLIC BLOOD PRESSURE: 62 MMHG | HEIGHT: 62 IN | BODY MASS INDEX: 23.89 KG/M2 | HEART RATE: 91 BPM | SYSTOLIC BLOOD PRESSURE: 120 MMHG | TEMPERATURE: 97.9 F | WEIGHT: 129.8 LBS

## 2022-03-11 DIAGNOSIS — L02.91 ABSCESS: Primary | ICD-10-CM

## 2022-03-11 DIAGNOSIS — R59.0 CERVICAL LYMPHADENOPATHY: ICD-10-CM

## 2022-03-11 PROCEDURE — 4004F PT TOBACCO SCREEN RCVD TLK: CPT | Performed by: FAMILY MEDICINE

## 2022-03-11 PROCEDURE — 99213 OFFICE O/P EST LOW 20 MIN: CPT | Performed by: FAMILY MEDICINE

## 2022-03-11 PROCEDURE — 3725F SCREEN DEPRESSION PERFORMED: CPT | Performed by: FAMILY MEDICINE

## 2022-03-11 PROCEDURE — 3008F BODY MASS INDEX DOCD: CPT | Performed by: FAMILY MEDICINE

## 2022-03-11 RX ORDER — DOXYCYCLINE HYCLATE 100 MG/1
100 CAPSULE ORAL EVERY 12 HOURS SCHEDULED
Qty: 60 CAPSULE | Refills: 0 | Status: SHIPPED | OUTPATIENT
Start: 2022-03-11 | End: 2022-04-10

## 2022-03-11 NOTE — PROGRESS NOTES
Assessment/Plan: patient will be referred to ENT for ongoing cervical adenopathy  Patient use warm compresses and doxycycline as directed for abscess /cyst   Patient will follow with Dermatology in April  Diagnoses and all orders for this visit:    Abscess  -     doxycycline hyclate (VIBRAMYCIN) 100 mg capsule; Take 1 capsule (100 mg total) by mouth every 12 (twelve) hours    Cervical lymphadenopathy  -     doxycycline hyclate (VIBRAMYCIN) 100 mg capsule; Take 1 capsule (100 mg total) by mouth every 12 (twelve) hours  -     Ambulatory Referral to Otolaryngology; Future            Subjective:        Patient ID: Josy Mas is a 39 y o  female  Is here with multiple issues  Patient with abscess on right jaw line  No dental pain noted  No fever noted by the patient  Patient is noticing some sweats  Patient also noticing some lymph node are swollen  Patient also with some itching and inflammation on the left cheek face  Patient has cough also  Patient also has  The GERD Symptoms  Patient will be seeing Dermatology in April  Patient did see GI in the past   Patient did see ENT  The following portions of the patient's history were reviewed and updated as appropriate: allergies, current medications, past family history, past medical history, past social history, past surgical history and problem list       Review of Systems   Constitutional: Negative  HENT: Negative  Eyes: Negative  Respiratory: Positive for cough  Cardiovascular: Negative  Gastrointestinal: Negative  GERD   Endocrine: Negative  Genitourinary: Negative  Musculoskeletal: Negative  Skin: Positive for color change  Negative for wound  Allergic/Immunologic: Negative  Neurological: Negative  Hematological: Negative  Psychiatric/Behavioral: Negative  Objective:        Depression Screening and Follow-up Plan: Patient was screened for depression during today's encounter   They screened negative with a PHQ-2 score of 0  Tobacco Cessation Counseling: Tobacco cessation counseling was provided  The patient is sincerely urged to quit consumption of tobacco  She is not ready to quit tobacco            /62 (BP Location: Right arm, Patient Position: Sitting, Cuff Size: Standard)   Pulse 91   Temp 97 9 °F (36 6 °C) (Tympanic)   Ht 5' 2" (1 575 m)   Wt 58 9 kg (129 lb 12 8 oz)   SpO2 97%   BMI 23 74 kg/m²          Physical Exam  Vitals and nursing note reviewed  Constitutional:       General: She is not in acute distress  Appearance: Normal appearance  She is not ill-appearing, toxic-appearing or diaphoretic  HENT:      Head: Normocephalic and atraumatic  Right Ear: Tympanic membrane, ear canal and external ear normal  There is no impacted cerumen  Left Ear: Tympanic membrane, ear canal and external ear normal  There is no impacted cerumen  Nose: Nose normal  No congestion or rhinorrhea  Mouth/Throat:      Mouth: Mucous membranes are moist       Pharynx: No oropharyngeal exudate or posterior oropharyngeal erythema  Eyes:      General: No scleral icterus  Right eye: No discharge  Left eye: No discharge  Extraocular Movements: Extraocular movements intact  Conjunctiva/sclera: Conjunctivae normal       Pupils: Pupils are equal, round, and reactive to light  Neck:      Vascular: No carotid bruit  Cardiovascular:      Rate and Rhythm: Normal rate and regular rhythm  Pulses: Normal pulses  Heart sounds: Normal heart sounds  No murmur heard  No friction rub  No gallop  Pulmonary:      Effort: Pulmonary effort is normal  No respiratory distress  Breath sounds: Normal breath sounds  No stridor  No wheezing, rhonchi or rales  Chest:      Chest wall: No tenderness  Musculoskeletal:         General: No swelling, tenderness, deformity or signs of injury  Normal range of motion  Cervical back: Neck supple   No rigidity  No muscular tenderness  Right lower leg: No edema  Left lower leg: No edema  Lymphadenopathy:      Cervical: Cervical adenopathy present  Skin:     General: Skin is warm and dry  Capillary Refill: Capillary refill takes less than 2 seconds  Coloration: Skin is not jaundiced  Findings: Erythema and rash present  No bruising or lesion  Comments: Abscess that the right mandible  Erythema noted  Cervical adenopathy bilaterally  Cysts on left face x2   Neurological:      General: No focal deficit present  Mental Status: She is alert and oriented to person, place, and time  Cranial Nerves: No cranial nerve deficit  Sensory: No sensory deficit  Motor: No weakness  Coordination: Coordination normal       Gait: Gait normal    Psychiatric:         Mood and Affect: Mood normal          Behavior: Behavior normal          Thought Content:  Thought content normal          Judgment: Judgment normal

## 2022-04-12 ENCOUNTER — OFFICE VISIT (OUTPATIENT)
Dept: DERMATOLOGY | Facility: CLINIC | Age: 37
End: 2022-04-12
Payer: COMMERCIAL

## 2022-04-12 VITALS — TEMPERATURE: 98.2 F | BODY MASS INDEX: 23.55 KG/M2 | HEIGHT: 62 IN | WEIGHT: 128 LBS

## 2022-04-12 DIAGNOSIS — L70.0 CYSTIC ACNE: ICD-10-CM

## 2022-04-12 DIAGNOSIS — L56.4 POLYMORPHIC LIGHT ERUPTION: Primary | ICD-10-CM

## 2022-04-12 PROCEDURE — 4004F PT TOBACCO SCREEN RCVD TLK: CPT | Performed by: DERMATOLOGY

## 2022-04-12 PROCEDURE — 3008F BODY MASS INDEX DOCD: CPT | Performed by: DERMATOLOGY

## 2022-04-12 PROCEDURE — 99204 OFFICE O/P NEW MOD 45 MIN: CPT | Performed by: DERMATOLOGY

## 2022-04-12 RX ORDER — DOXYCYCLINE 50 MG/1
50 TABLET ORAL 2 TIMES DAILY
COMMUNITY

## 2022-04-12 RX ORDER — CLINDAMYCIN AND BENZOYL PEROXIDE 10; 50 MG/G; MG/G
GEL TOPICAL 2 TIMES DAILY
Qty: 25 G | Refills: 0 | Status: SHIPPED | OUTPATIENT
Start: 2022-04-12 | End: 2022-04-12

## 2022-04-12 RX ORDER — SPIRONOLACTONE 50 MG/1
50 TABLET, FILM COATED ORAL DAILY
Qty: 30 TABLET | Refills: 5 | Status: SHIPPED | OUTPATIENT
Start: 2022-04-12 | End: 2022-05-10

## 2022-04-12 RX ORDER — CLINDAMYCIN AND BENZOYL PEROXIDE 10; 50 MG/G; MG/G
GEL TOPICAL
Qty: 25 G | Refills: 0 | Status: SHIPPED | OUTPATIENT
Start: 2022-04-12 | End: 2022-05-13

## 2022-04-12 NOTE — PATIENT INSTRUCTIONS
1  POLYMORHIC LIGHT ERUPTION      Assessment and Plan:  Based on a thorough discussion of this condition and the management approach to it (including a comprehensive discussion of the known risks, side effects and potential benefits of treatment), the patient (family) agrees to implement the following specific plan:   Differential Lupus syndrome- please go for lab work as tested    A Biopsy may be useful if we can catch it when its present    Wean from tanning bed    Hydroxychloroquine may be consideration if YASMEEN negative    Follow up in 1 month      2   CYSTIC ACNE     Assessment and Plan:  Based on a thorough discussion of this condition and the management approach to it (including a comprehensive discussion of the known risks, side effects and potential benefits of treatment), the patient (family) agrees to implement the following specific plan:   OTC Differin Cream; apply a pea sized amount evenly across the face 1 hour before bedtime    Prescription Benzaclin Gel: apply topically twice a day to the face    Dicussed start Spironolactone 50 mg daily; prescribed to your local pharmacy    Follow up in 1 month

## 2022-04-12 NOTE — PROGRESS NOTES
[facility] Note     Patient Name: Adelina Mitchell  Encounter Date: 04/12/2022     Have you been cared for by a [facility] Dermatologist in the last 3 years and, if so, which one? No    · Have you traveled outside of the [address] [place] [address] in the past 3 months or outside of the [facility] area in the last 2 weeks? No     May we call your Preferred Phone number to discuss your specific medical information? Yes     May we leave a detailed message that includes your specific medical information? Yes      Today's Chief Concerns:   Concern #1:  Rash with swollen lymph nodes       Past Medical History:  Have you personally ever had or currently have any of the following? · Skin cancer (such as Melanoma, Basal Cell Carcinoma, Squamous Cell Carcinoma? (If Yes, please provide more detail)- No  · Eczema: No  · Psoriasis: No  · HIV/AIDS: No  · Hepatitis B or C: No  · Tuberculosis: No  · Systemic Immunosuppression such as Diabetes, Biologic or Immunotherapy, Chemotherapy, Organ Transplantation, Bone Marrow Transplantation (If YES, please provide more detail): No  · Radiation Treatment (If YES, please provide more detail): No  · Any other major medical conditions/concerns? 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 Gym     Family History:  Have any of your "first degree relatives" (parent, brother, sister, or child) had any of the following       · Skin cancer such as Melanoma or Merkel Cell Carcinoma or Pancreatic Cancer? YES, Maternal Grandmother- Melanoma   · Eczema, Asthma, Hay Fever or Seasonal Allergies: No  · Psoriasis or Psoriatic Arthritis: No  · Do any other medical conditions seem to run in your family? If Yes, what condition and which relatives?   No    Current Medications:       Current Outpatient Medications:     doxycycline (ADOXA) 50 MG tablet, Take 50 mg by mouth 2 (two) times a day, Disp: , Rfl:       Review of Systems:  Have you recently had or currently have any of the following? If YES, what are you doing for the problem? · Fever, chills or unintended weight loss: YES,   · Sudden loss or change in your vision: YES,   · Nausea, vomiting or blood in your stool: YES,   · Painful or swollen joints: No  · Wheezing or cough: YES,   · Changing mole or non-healing wound: YES,   · Nosebleeds: No  · Excessive sweating: YES,   · Easy or prolonged bleeding? No  · Over the last 2 weeks, how often have you been bothered by the following problems? · Taking little interest or pleasure in doing things: 1 - Not at All  · Feeling down, depressed, or hopeless: 1 - Not at All  · Rapid heartbeat with epinephrine:  No    · FEMALES ONLY:    · Are you pregnant or planning to become pregnant? No  · Are you currently or planning to be nursing or breast feeding? No    · Any known allergies? No Known Allergies      Physical Exam:     Was a chaperone (Derm Clinical Assistant) present throughout the entire Physical Exam? Yes     Did the Dermatology Team specifically  the patient on the importance of a Full Skin Exam to be sure that nothing is missed clinically?  Yes}  o Did the patient ultimately request or accept a Full Skin Exam?  NO  o Did the patient specifically refuse to have the areas "under-the-bra" examined by the Dermatologist? No  o Did the patient specifically refuse to have the areas "under-the-underwear" examined by the Dermatologist? No    CONSTITUTIONAL:   Vitals:    04/12/22 0939   Temp: 98 2 °F (36 8 °C)   TempSrc: Temporal   Weight: 58 1 kg (128 lb)   Height: 5' 2" (1 575 m)         PSYCH: Normal mood and affect  EYES: Normal conjunctiva  ENT: Normal lips and oral mucosa  CARDIOVASCULAR: No edema  RESPIRATORY: Normal respirations  HEME/LYMPH/IMMUNO:  No regional lymphadenopathy except as noted below in "ASSESSMENT AND PLAN BY DIAGNOSIS"    SKIN:  FULL ORGAN SYSTEM EXAM   Hair, Scalp, Ears, Face Normal except as noted below in Assessment   Neck, Cervical Chain Nodes Normal except as noted below in Assessment   Right Arm/Hand/Fingers Normal except as noted below in Assessment   Left Arm/Hand/Fingers Normal except as noted below in Assessment   Chest/Breasts/Axillae Viewed areas Normal except as noted below in Assessment   Abdomen, Umbilicus Normal except as noted below in Assessment   Back/Spine Normal except as noted below in Assessment   Groin/Genitalia/Buttocks Normal except as noted below in Assessment   Right Leg, Foot, Toes Normal except as noted below in Assessment   Left Leg, Foot, Toes Normal except as noted below in Assessment        Assessment and Plan by Diagnosis:    History of Present Condition:     Duration:  How long has this been an issue for you?    o  1 year    Location Affected:  Where on the body is this affecting you?    o  All over/ Lymph nodes neck, Left armpit, and groin    Quality:  Is there any bleeding, pain, itch, burning/irritation, or redness associated with the skin lesion? o  Bleeding, pain, itching, burning, and redness    Severity:  Describe any bleeding, pain, itch, burning/irritation, or redness on a scale of 1 to 10 (with 10 being the worst)  o  2   Timing:  Does this condition seem to be there pretty constantly or do you notice it more at specific times throughout the day?    o  Denies   Context:  Have you ever noticed that this condition seems to be associated with specific activities you do?    o  Denies   Modifying Factors:    o Anything that seems to make the condition worse?    -  Denies  o What have you tried to do to make the condition better?    -  Doxycycline   - Prednisone    Associated Signs and Symptoms:  Does this skin lesion seem to be associated with any of the following:  o  SL AMB DERM SIGNS AND SYMPTOMS: Redness, Itching and Scratching and Bleeding       1   POLYMORHIC LIGHT ERUPTION     Physical Exam:   Anatomic Location Affected:  Currently Clear   Morphological Description:  Currently Clear    Pertinent Positives:   Pertinent Negatives: Additional History of Present Condition:  Long standing stable with intense tanning bed use  Assessment and Plan:  Based on a thorough discussion of this condition and the management approach to it (including a comprehensive discussion of the known risks, side effects and potential benefits of treatment), the patient (family) agrees to implement the following specific plan:   Differential Lupus syndrome- please go for lab work as tested    A Biopsy may be useful if we can catch it when its present    Wean from tanning bed    Hydroxychloroquine may be consideration if YASMEEN negative    Follow up in 1 month       2  CYSTIC ACNE     Physical Exam:   Anatomic Location Affected: Face    Morphological Description:  Open and closed nodules with black heads and adrenopathy    Pertinent Positives:   Pertinent Negatives: Additional History of Present Condition:  Suspect adrenopathy in the neck may be related to lymph nodes in next but does need to be evaluated       Assessment and Plan:  Based on a thorough discussion of this condition and the management approach to it (including a comprehensive discussion of the known risks, side effects and potential benefits of treatment), the patient (family) agrees to implement the following specific plan:   OTC Differin Cream; apply a pea sized amount evenly across the face 1 hour before bedtime    Prescription Benzaclin Gel: apply topically twice a day to the face    Dicussed start Spironolactone 50 mg daily; prescribed to your local pharmacy    Follow up in 1 month   I reviewed concerns with long term doxyclina and potential exacerbation of photosensitivity          Scribe Attestation    I,:  Hang Bermudez am acting as a scribe while in the presence of the attending physician :       I,:  Henny Garcia MD personally performed the services described in this documentation    as scribed in my presence :

## 2022-05-06 ENCOUNTER — LAB (OUTPATIENT)
Dept: LAB | Facility: HOSPITAL | Age: 37
End: 2022-05-06
Attending: DERMATOLOGY
Payer: COMMERCIAL

## 2022-05-06 DIAGNOSIS — L56.4 POLYMORPHIC LIGHT ERUPTION: Primary | ICD-10-CM

## 2022-05-06 LAB
ALBUMIN SERPL BCP-MCNC: 4.2 G/DL (ref 3.5–5)
ALP SERPL-CCNC: 52 U/L (ref 46–116)
ALT SERPL W P-5'-P-CCNC: 20 U/L (ref 12–78)
ANION GAP SERPL CALCULATED.3IONS-SCNC: 10 MMOL/L (ref 4–13)
AST SERPL W P-5'-P-CCNC: 18 U/L (ref 5–45)
BILIRUB SERPL-MCNC: 0.63 MG/DL (ref 0.2–1)
BUN SERPL-MCNC: 15 MG/DL (ref 5–25)
CALCIUM SERPL-MCNC: 9.2 MG/DL (ref 8.3–10.1)
CHLORIDE SERPL-SCNC: 100 MMOL/L (ref 100–108)
CO2 SERPL-SCNC: 28 MMOL/L (ref 21–32)
CREAT SERPL-MCNC: 0.88 MG/DL (ref 0.6–1.3)
GFR SERPL CREATININE-BSD FRML MDRD: 84 ML/MIN/1.73SQ M
GLUCOSE P FAST SERPL-MCNC: 126 MG/DL (ref 65–99)
POTASSIUM SERPL-SCNC: 3.9 MMOL/L (ref 3.5–5.3)
PROT SERPL-MCNC: 7.2 G/DL (ref 6.4–8.2)
SODIUM SERPL-SCNC: 138 MMOL/L (ref 136–145)

## 2022-05-06 PROCEDURE — 80053 COMPREHEN METABOLIC PANEL: CPT

## 2022-05-06 PROCEDURE — 36415 COLL VENOUS BLD VENIPUNCTURE: CPT

## 2022-05-06 PROCEDURE — 86225 DNA ANTIBODY NATIVE: CPT

## 2022-05-06 PROCEDURE — 86235 NUCLEAR ANTIGEN ANTIBODY: CPT

## 2022-05-06 PROCEDURE — 86038 ANTINUCLEAR ANTIBODIES: CPT

## 2022-05-07 LAB
ANA TITR SER IF: NEGATIVE {TITER}
DSDNA AB SER-ACNC: 1 IU/ML (ref 0–9)
ENA SS-A AB SER-ACNC: <0.2 AI (ref 0–0.9)
ENA SS-B AB SER-ACNC: <0.2 AI (ref 0–0.9)

## 2022-05-10 ENCOUNTER — OFFICE VISIT (OUTPATIENT)
Dept: DERMATOLOGY | Facility: CLINIC | Age: 37
End: 2022-05-10
Payer: COMMERCIAL

## 2022-05-10 VITALS — WEIGHT: 131 LBS | TEMPERATURE: 97.8 F | HEIGHT: 62 IN | BODY MASS INDEX: 24.11 KG/M2

## 2022-05-10 DIAGNOSIS — L70.0 CYSTIC ACNE: ICD-10-CM

## 2022-05-10 DIAGNOSIS — L56.4 POLYMORPHIC LIGHT ERUPTION: Primary | ICD-10-CM

## 2022-05-10 PROCEDURE — 3008F BODY MASS INDEX DOCD: CPT | Performed by: DERMATOLOGY

## 2022-05-10 PROCEDURE — 4004F PT TOBACCO SCREEN RCVD TLK: CPT | Performed by: DERMATOLOGY

## 2022-05-10 PROCEDURE — 99213 OFFICE O/P EST LOW 20 MIN: CPT | Performed by: DERMATOLOGY

## 2022-05-10 RX ORDER — SPIRONOLACTONE 100 MG/1
100 TABLET, FILM COATED ORAL DAILY
Qty: 30 TABLET | Refills: 5 | Status: SHIPPED | OUTPATIENT
Start: 2022-05-10

## 2022-05-10 RX ORDER — HYDROXYCHLOROQUINE SULFATE 200 MG/1
TABLET, FILM COATED ORAL
Qty: 30 TABLET | Refills: 3 | Status: SHIPPED | OUTPATIENT
Start: 2022-05-10 | End: 2023-08-10

## 2022-05-10 NOTE — PROGRESS NOTES
[facility] Follow Up Note    Patient Name: Cher Walsh  Encounter Date: 05/10/22    Today's Chief Concern   Concern #1:  POLYMORHIC LIGHT ERUPTION    Concern #2:  CYSTIC ACNE       Current Medications:    Current Outpatient Medications:     clindamycin-benzoyl peroxide (BENZACLIN) gel, APPLY TO AFFECTED AREA TWICE A DAY, Disp: 25 g, Rfl: 0    spironolactone (ALDACTONE) 50 mg tablet, Take 1 tablet (50 mg total) by mouth daily, Disp: 30 tablet, Rfl: 5    doxycycline (ADOXA) 50 MG tablet, Take 50 mg by mouth 2 (two) times a day (Patient not taking: Reported on 5/10/2022 ), Disp: , Rfl:     CONSTITUTIONAL:   Vitals:    05/10/22 1103   Temp: 97 8 °F (36 6 °C)   TempSrc: Temporal   Weight: 59 4 kg (131 lb)   Height: 5' 2" (1 575 m)     Specific Alerts:    Have you been seen by a [facility] Dermatologist in the last 3 years? YES    Are you pregnant or planning to become pregnant? No    Are you currently or planning to be nursing or breast feeding? No    No Known Allergies    May we call your Preferred Phone number to discuss your specific medical information? YES    May we leave a detailed message that includes your specific medical information? YES    Have you traveled outside of the [facility] in the past 3 months? No    Do you currently have a pacemaker or defibrillator? No    Do you have any artificial heart valves, joints, plates, screws, rods, stents, pins, etc? No   - If Yes, were any placed within the last 2 years? Do you require any medications prior to a surgical procedure? No      Are you taking any medications that cause you to bleed more easily ("blood thinners") No    Have you ever experienced a rapid heartbeat with epinephrine? No    Have you ever been treated with "gold" (gold sodium thiomalate) therapy? No    [facility] can help with wrinkles, "laugh lines," facial volume loss, "double chin," "love handles," age spots, and more   Are you interested in learning today about some of the skin enhancement procedures that we offer? (If Yes, please provide more detail) No    Review of Systems:  Have you recently had or currently have any of the following?     · Fever or chills: No  · Night Sweats: YES  · Headaches: YES  · Weight Gain: No  · Weight Loss: No  · Blurry Vision: YES  · Nausea: No  · Vomiting: No  · Diarrhea: No  · Blood in Stool: No  · Abdominal Pain: YES  · Itchy Skin: YES  · Painful Joints: YES  · Swollen Joints: YES  · Muscle Pain: No  · Irregular Mole: No  · Sun Burn: No  · Dry Skin: YES  · Skin Color Changes: No  · Scar or Keloid: YES  · Cold Sores/Fever Blisters: No  · Bacterial Infections/MRSA: No  · Anxiety: No  · Depression: No  · Suicidal or Homicidal Thoughts: No      PSYCH: Normal mood and affect  EYES: Normal conjunctiva  ENT: Normal lips and oral mucosa  CARDIOVASCULAR: No edema  RESPIRATORY: Normal respirations  HEME/LYMPH/IMMUNO:  No regional lymphadenopathy except as noted below in ASSESSMENT AND PLAN BY DIAGNOSIS    FOCUSED ORGAN SYSTEM SKIN EXAM (SKIN)  Hair, Scalp, Ears, Face Normal except as noted below in Assessment   Neck, Cervical Chain Nodes Normal except as noted below in Assessment   Right Arm/Hand/Fingers Normal except as noted below in Assessment   Left Arm/Hand/Fingers Normal except as noted below in Assessment   Back/Spine Normal except as noted below in Assessment   Buttocks Viewed areas Normal except as noted below in Assessment     POLYMORHIC LIGHT ERUPTION    Physical Exam:  · Anatomic Location Affected:  Currently Clear  · Morphological Description:  Currently Clear   · Pertinent Positives:  · Pertinent Negatives:     Additional History of Present Condition:  Long standing stable with intense tanning bed use       Assessment and Plan:  Based on a thorough discussion of this condition and the management approach to it (including a comprehensive discussion of the known risks, side effects and potential benefits of treatment), the patient (family) agrees to implement the following specific plan:  · Take Plaquenil 200 mg once daily; watch for upset stomach; let us know  · Get regular eye exams every 6 months  · Get ORDERED blood work done in 1 month; CMP, CBC  · I discussed safety and adverse effects   Zia Castillo I noted puprpose is to decrase her ongoing dependence of tanning beds to prevent photosensitivity  CYSTIC ACNE    Physical Exam:  · Anatomic Location Affected: Face   · Morphological Description:  Open and closed nodules with black heads and adrenopathy   · Pertinent Positives:  · Pertinent Negatives:     Additional History of Present Condition:  patient been taking spironolactone 50 mg     Assessment and Plan:  Based on a thorough discussion of this condition and the management approach to it (including a comprehensive discussion of the known risks, side effects and potential benefits of treatment), the patient (family) agrees to implement the following specific plan:     Take spironolactone 100 mg once daily with full glass of water      ·      Scribe Attestation    I,:  Antelmo Ruiz am acting as a scribe while in the presence of the attending physician :       I,:  Adelia Madera MD personally performed the services described in this documentation    as scribed in my presence : 4

## 2022-05-10 NOTE — PATIENT INSTRUCTIONS
POLYMORHIC LIGHT ERUPTION       Assessment and Plan:  Based on a thorough discussion of this condition and the management approach to it (including a comprehensive discussion of the known risks, side effects and potential benefits of treatment), the patient (family) agrees to implement the following specific plan:  · Take Plaquenil 200 mg once daily; watch for upset stomach; let us know  · Get regular eye exams every 6 months  · Get ORDERED blood work done in 1 month; CMP, CBC      CYSTIC ACNE      Assessment and Plan:  Based on a thorough discussion of this condition and the management approach to it (including a comprehensive discussion of the known risks, side effects and potential benefits of treatment), the patient (family) agrees to implement the following specific plan:     Take spironolactone 100 mg once daily with full glass of water      ·

## 2022-05-13 DIAGNOSIS — L70.0 ACNE VULGARIS: ICD-10-CM

## 2022-05-13 DIAGNOSIS — L70.0 ACNE VULGARIS: Primary | ICD-10-CM

## 2022-05-13 RX ORDER — CLINDAMYCIN PHOSPHATE 10 UG/ML
LOTION TOPICAL
Qty: 60 ML | Refills: 0 | OUTPATIENT
Start: 2022-05-13

## 2022-05-13 RX ORDER — CLINDAMYCIN PHOSPHATE AND BENZOYL PEROXIDE 10; 50 MG/G; MG/G
1 GEL TOPICAL 2 TIMES DAILY
Qty: 45 G | Refills: 1 | Status: SHIPPED | OUTPATIENT
Start: 2022-05-13 | End: 2022-05-13

## 2022-05-13 RX ORDER — CLINDAMYCIN PHOSPHATE 10 UG/ML
LOTION TOPICAL 2 TIMES DAILY
Qty: 60 ML | Refills: 0 | Status: SHIPPED | OUTPATIENT
Start: 2022-05-13 | End: 2022-05-16 | Stop reason: ALTCHOICE

## 2022-05-13 NOTE — TELEPHONE ENCOUNTER
Please advise patient that her insurance does not seem to cover any topcial  acne products    I suggest she just use OTC benzoyl peroxide gel 5%

## 2022-05-16 RX ORDER — CLINDAMYCIN PHOSPHATE, BENZOYL PEROXIDE 25; 10 MG/G; MG/G
1 GEL TOPICAL 2 TIMES DAILY
Qty: 50 G | Refills: 3 | Status: SHIPPED | OUTPATIENT
Start: 2022-05-16 | End: 2023-05-16

## 2022-05-16 NOTE — TELEPHONE ENCOUNTER
I have left a message for the patient to call back to inform her of alternative medication per Dr Virginia Kirkpatrick

## 2022-05-16 NOTE — TELEPHONE ENCOUNTER
According to patients drug formulary the following are covered:    Atralin 0 05% gel   Altreno 0 05% lotion  Epiduo 0 1-2 5% gel  Epiduo Forte 0 3-2 5 % gel  Ancanya 1 2-2 5 %    All in which have to be ordered brand or dispense as written

## 2022-10-24 ENCOUNTER — APPOINTMENT (OUTPATIENT)
Dept: LAB | Facility: HOSPITAL | Age: 37
End: 2022-10-24
Payer: COMMERCIAL

## 2022-10-24 DIAGNOSIS — L56.4 POLYMORPHIC LIGHT ERUPTION: ICD-10-CM

## 2022-10-24 LAB
ALBUMIN SERPL BCP-MCNC: 3.8 G/DL (ref 3.5–5)
ALP SERPL-CCNC: 39 U/L (ref 46–116)
ALT SERPL W P-5'-P-CCNC: 13 U/L (ref 12–78)
ANION GAP SERPL CALCULATED.3IONS-SCNC: 8 MMOL/L (ref 4–13)
AST SERPL W P-5'-P-CCNC: 13 U/L (ref 5–45)
BASOPHILS # BLD AUTO: 0.07 THOUSANDS/ÂΜL (ref 0–0.1)
BASOPHILS NFR BLD AUTO: 1 % (ref 0–1)
BILIRUB SERPL-MCNC: 0.58 MG/DL (ref 0.2–1)
BUN SERPL-MCNC: 18 MG/DL (ref 5–25)
CALCIUM SERPL-MCNC: 8.5 MG/DL (ref 8.3–10.1)
CHLORIDE SERPL-SCNC: 103 MMOL/L (ref 96–108)
CO2 SERPL-SCNC: 29 MMOL/L (ref 21–32)
CREAT SERPL-MCNC: 0.77 MG/DL (ref 0.6–1.3)
EOSINOPHIL # BLD AUTO: 0.08 THOUSAND/ÂΜL (ref 0–0.61)
EOSINOPHIL NFR BLD AUTO: 1 % (ref 0–6)
ERYTHROCYTE [DISTWIDTH] IN BLOOD BY AUTOMATED COUNT: 12.2 % (ref 11.6–15.1)
GFR SERPL CREATININE-BSD FRML MDRD: 98 ML/MIN/1.73SQ M
GLUCOSE SERPL-MCNC: 91 MG/DL (ref 65–140)
HCT VFR BLD AUTO: 44.4 % (ref 34.8–46.1)
HGB BLD-MCNC: 15 G/DL (ref 11.5–15.4)
IMM GRANULOCYTES # BLD AUTO: 0.02 THOUSAND/UL (ref 0–0.2)
IMM GRANULOCYTES NFR BLD AUTO: 0 % (ref 0–2)
LYMPHOCYTES # BLD AUTO: 2.11 THOUSANDS/ÂΜL (ref 0.6–4.47)
LYMPHOCYTES NFR BLD AUTO: 30 % (ref 14–44)
MCH RBC QN AUTO: 33.7 PG (ref 26.8–34.3)
MCHC RBC AUTO-ENTMCNC: 33.8 G/DL (ref 31.4–37.4)
MCV RBC AUTO: 100 FL (ref 82–98)
MONOCYTES # BLD AUTO: 0.63 THOUSAND/ÂΜL (ref 0.17–1.22)
MONOCYTES NFR BLD AUTO: 9 % (ref 4–12)
NEUTROPHILS # BLD AUTO: 4.09 THOUSANDS/ÂΜL (ref 1.85–7.62)
NEUTS SEG NFR BLD AUTO: 59 % (ref 43–75)
NRBC BLD AUTO-RTO: 0 /100 WBCS
PLATELET # BLD AUTO: 260 THOUSANDS/UL (ref 149–390)
PMV BLD AUTO: 9.4 FL (ref 8.9–12.7)
POTASSIUM SERPL-SCNC: 4.2 MMOL/L (ref 3.5–5.3)
PROT SERPL-MCNC: 7 G/DL (ref 6.4–8.4)
RBC # BLD AUTO: 4.45 MILLION/UL (ref 3.81–5.12)
SODIUM SERPL-SCNC: 140 MMOL/L (ref 135–147)
WBC # BLD AUTO: 7 THOUSAND/UL (ref 4.31–10.16)

## 2022-10-24 PROCEDURE — 85025 COMPLETE CBC W/AUTO DIFF WBC: CPT

## 2022-10-24 PROCEDURE — 80053 COMPREHEN METABOLIC PANEL: CPT

## 2022-10-24 PROCEDURE — 36415 COLL VENOUS BLD VENIPUNCTURE: CPT

## 2022-12-07 ENCOUNTER — HOSPITAL ENCOUNTER (EMERGENCY)
Facility: HOSPITAL | Age: 37
Discharge: HOME | End: 2022-12-07
Attending: EMERGENCY MEDICINE

## 2022-12-07 ENCOUNTER — APPOINTMENT (EMERGENCY)
Dept: ULTRASOUND IMAGING | Facility: HOSPITAL | Age: 37
End: 2022-12-07

## 2022-12-07 ENCOUNTER — APPOINTMENT (EMERGENCY)
Dept: CT IMAGING | Facility: HOSPITAL | Age: 37
End: 2022-12-07

## 2022-12-07 VITALS
RESPIRATION RATE: 16 BRPM | TEMPERATURE: 98.2 F | SYSTOLIC BLOOD PRESSURE: 145 MMHG | OXYGEN SATURATION: 100 % | BODY MASS INDEX: 24.07 KG/M2 | HEART RATE: 73 BPM | DIASTOLIC BLOOD PRESSURE: 83 MMHG | WEIGHT: 131.61 LBS

## 2022-12-07 DIAGNOSIS — N83.201 RIGHT OVARIAN CYST: Primary | ICD-10-CM

## 2022-12-07 LAB
ALBUMIN SERPL BCP-MCNC: 4 G/DL (ref 3.5–5)
ALP SERPL-CCNC: 42 U/L (ref 46–116)
ALT SERPL W P-5'-P-CCNC: 18 U/L (ref 12–78)
ANION GAP SERPL CALCULATED.3IONS-SCNC: 7 MMOL/L (ref 4–13)
AST SERPL W P-5'-P-CCNC: 15 U/L (ref 5–45)
BASOPHILS # BLD AUTO: 0.07 THOUSANDS/ÂΜL (ref 0–0.1)
BASOPHILS NFR BLD AUTO: 1 % (ref 0–1)
BILIRUB SERPL-MCNC: 0.28 MG/DL (ref 0.2–1)
BILIRUB UR QL STRIP: NEGATIVE
BUN SERPL-MCNC: 12 MG/DL (ref 5–25)
CALCIUM SERPL-MCNC: 8.9 MG/DL (ref 8.3–10.1)
CHLORIDE SERPL-SCNC: 101 MMOL/L (ref 96–108)
CLARITY UR: CLEAR
CO2 SERPL-SCNC: 29 MMOL/L (ref 21–32)
COLOR UR: YELLOW
CREAT SERPL-MCNC: 0.72 MG/DL (ref 0.6–1.3)
EOSINOPHIL # BLD AUTO: 0.04 THOUSAND/ÂΜL (ref 0–0.61)
EOSINOPHIL NFR BLD AUTO: 1 % (ref 0–6)
ERYTHROCYTE [DISTWIDTH] IN BLOOD BY AUTOMATED COUNT: 12.2 % (ref 11.6–15.1)
GFR SERPL CREATININE-BSD FRML MDRD: 107 ML/MIN/1.73SQ M
GLUCOSE SERPL-MCNC: 82 MG/DL (ref 65–140)
GLUCOSE SERPL-MCNC: 96 MG/DL (ref 65–140)
GLUCOSE UR STRIP-MCNC: ABNORMAL MG/DL
HCT VFR BLD AUTO: 41.7 % (ref 34.8–46.1)
HGB BLD-MCNC: 14 G/DL (ref 11.5–15.4)
HGB UR QL STRIP.AUTO: NEGATIVE
IMM GRANULOCYTES # BLD AUTO: 0.01 THOUSAND/UL (ref 0–0.2)
IMM GRANULOCYTES NFR BLD AUTO: 0 % (ref 0–2)
KETONES UR STRIP-MCNC: NEGATIVE MG/DL
LEUKOCYTE ESTERASE UR QL STRIP: NEGATIVE
LIPASE SERPL-CCNC: 109 U/L (ref 73–393)
LYMPHOCYTES # BLD AUTO: 2.27 THOUSANDS/ÂΜL (ref 0.6–4.47)
LYMPHOCYTES NFR BLD AUTO: 27 % (ref 14–44)
MCH RBC QN AUTO: 33 PG (ref 26.8–34.3)
MCHC RBC AUTO-ENTMCNC: 33.6 G/DL (ref 31.4–37.4)
MCV RBC AUTO: 98 FL (ref 82–98)
MONOCYTES # BLD AUTO: 0.6 THOUSAND/ÂΜL (ref 0.17–1.22)
MONOCYTES NFR BLD AUTO: 7 % (ref 4–12)
NEUTROPHILS # BLD AUTO: 5.42 THOUSANDS/ÂΜL (ref 1.85–7.62)
NEUTS SEG NFR BLD AUTO: 64 % (ref 43–75)
NITRITE UR QL STRIP: NEGATIVE
NRBC BLD AUTO-RTO: 0 /100 WBCS
PH UR STRIP.AUTO: 5.5 [PH] (ref 4.5–8)
PLATELET # BLD AUTO: 271 THOUSANDS/UL (ref 149–390)
PMV BLD AUTO: 9.1 FL (ref 8.9–12.7)
POTASSIUM SERPL-SCNC: 4.3 MMOL/L (ref 3.5–5.3)
PROT SERPL-MCNC: 6.9 G/DL (ref 6.4–8.4)
PROT UR STRIP-MCNC: NEGATIVE MG/DL
RBC # BLD AUTO: 4.24 MILLION/UL (ref 3.81–5.12)
SODIUM SERPL-SCNC: 137 MMOL/L (ref 135–147)
SP GR UR STRIP.AUTO: 1.01 (ref 1–1.03)
UROBILINOGEN UR QL STRIP.AUTO: 0.2 E.U./DL
WBC # BLD AUTO: 8.41 THOUSAND/UL (ref 4.31–10.16)

## 2022-12-07 RX ADMIN — IOHEXOL 35 ML: 300 INJECTION, SOLUTION INTRAVENOUS at 22:04

## 2022-12-07 RX ADMIN — IOHEXOL 100 ML: 350 INJECTION, SOLUTION INTRAVENOUS at 22:04

## 2022-12-08 NOTE — ED PROVIDER NOTES
History  Chief Complaint   Patient presents with   • Abdominal Pain     Patient reports she has been having abd pain for the past few days  Reports pain is now in her back on the R and radiating down the leg  C/o issues with urinating and bloating/swelling to her abd     Patient is a 51-year-old female  She does have a history of a hysterectomy  She presents to the emergency room for evaluation for possible appendicitis  She has had several days of right lower quadrant abdominal pain  It is worse with walking  No associated nausea or vomiting  No diarrhea or constipation  No hematemesis hematochezia or melena  No fever or chills  No vaginal or urinary complaints  There is anorexia  Pain is moderate in severity  No relieving factors  Prior to Admission Medications   Prescriptions Last Dose Informant Patient Reported? Taking?    Clindamycin Phos-Benzoyl Perox gel   No No   Sig: Apply 1 application topically 2 (two) times a day   doxycycline (ADOXA) 50 MG tablet   Yes No   Sig: Take 50 mg by mouth 2 (two) times a day   Patient not taking: Reported on 5/10/2022    hydroxychloroquine (PLAQUENIL) 200 mg tablet   No No   Sig: Take once daily with full glass of water   spironolactone (ALDACTONE) 100 mg tablet   No No   Sig: Take 1 tablet (100 mg total) by mouth daily With full glass of water      Facility-Administered Medications: None       Past Medical History:   Diagnosis Date   • Abdominal pain    • Abnormal MRI, cervical spine     last assessed: 2016   • Acne    • Back pain, chronic    • Chronic pain disorder    • Constipation    • Fatigue    • Hemorrhoids     resolved: 2017   • Loss of weight    • Migraines    • Nausea with vomiting    • PONV (postoperative nausea and vomiting)    • Tear of left rotator cuff     last assessed: 2016       Past Surgical History:   Procedure Laterality Date   •  SECTION      x 2   • COLONOSCOPY N/A 2016    Procedure: COLONOSCOPY;  Surgeon: Scott Whipple MD;  Location: [facility] GI LAB; Service:    • ENDOMETRIAL CRYOABLATION     • HERNIA REPAIR     • HYSTERECTOMY     • UT REVISE ULNAR NERVE AT ELBOW Left 8/16/2018    Procedure: RELEASE CUBITAL TUNNEL;  Surgeon: Fercho Godoy MD;  Location:  MAIN OR;  Service: Orthopedics   • UT REVISE ULNAR NERVE AT ELBOW Right 9/20/2018    Procedure: RELEASE CUBITAL TUNNEL;  Surgeon: Fercho Godoy MD;  Location:  MAIN OR;  Service: Orthopedics   • SHOULDER SURGERY Left    • TUBAL LIGATION         Family History   Problem Relation Age of Onset   • Ovarian cancer Mother 36   • Cancer Father         throat cancer   • Alcohol abuse Father         alcoholism   • Cirrhosis Father         hepatic   • Throat cancer Father    • Diabetes Maternal Grandfather         [email or web address]   • No Known Problems Sister    • No Known Problems Daughter    • Ovarian cancer Maternal Grandmother    • Melanoma Maternal Grandmother    • Cervical cancer Maternal Grandmother    • Colon cancer Maternal Grandmother    • No Known Problems Paternal Grandmother    • No Known Problems Paternal Grandfather    • No Known Problems Son    • No Known Problems Sister    • No Known Problems Sister    • No Known Problems Sister    • No Known Problems Brother    • No Known Problems Maternal Aunt    • Breast cancer Maternal Aunt 45   • No Known Problems Maternal Aunt    • No Known Problems Maternal Uncle    • No Known Problems Paternal Uncle    • Breast cancer Other 48     I have reviewed and agree with the history as documented      E-Cigarette/Vaping   • E-Cigarette Use Never User      E-Cigarette/Vaping Substances   • Nicotine No    • THC No    • CBD No    • Flavoring No    • Other No    • Unknown No      Social History     Tobacco Use   • Smoking status: Every Day     Years: 15 00     Types: Cigarettes   • Smokeless tobacco: Never   • Tobacco comments:     Has cut down to 4 daily   Vaping Use   • Vaping Use: Never used   Substance Use Topics   • Alcohol use: No   • Drug use: No       Review of Systems   Constitutional: Positive for fatigue  Negative for chills and fever  HENT: Negative for rhinorrhea and sore throat  Eyes: Negative for pain, redness and visual disturbance  Respiratory: Negative for cough and shortness of breath  Cardiovascular: Negative for chest pain and leg swelling  Gastrointestinal: Positive for abdominal pain  Negative for diarrhea and vomiting  Endocrine: Negative for polydipsia and polyuria  Genitourinary: Negative for dysuria, frequency, hematuria, vaginal bleeding and vaginal discharge  Musculoskeletal: Positive for back pain and neck pain  Skin: Negative for rash and wound  Allergic/Immunologic: Negative for immunocompromised state  Neurological: Positive for headaches  Negative for weakness and numbness  Hematological: Does not bruise/bleed easily  Psychiatric/Behavioral: Negative for hallucinations and suicidal ideas  All other systems reviewed and are negative  Physical Exam  Physical Exam  Vitals reviewed  Constitutional:       General: She is not in acute distress  HENT:      Head: Normocephalic and atraumatic  Nose: Nose normal       Mouth/Throat:      Mouth: Mucous membranes are moist    Eyes:      General:         Right eye: No discharge  Left eye: No discharge  Conjunctiva/sclera: Conjunctivae normal    Cardiovascular:      Rate and Rhythm: Normal rate and regular rhythm  Pulses: Normal pulses  Heart sounds: Normal heart sounds  No murmur heard  No friction rub  No gallop  Pulmonary:      Effort: Pulmonary effort is normal  No respiratory distress  Breath sounds: Normal breath sounds  No stridor  No wheezing, rhonchi or rales  Abdominal:      General: Bowel sounds are normal  There is no distension  Palpations: Abdomen is soft  Tenderness: There is abdominal tenderness in the right lower quadrant  There is left CVA tenderness   There is no right CVA tenderness, guarding or rebound  Musculoskeletal:         General: No swelling, tenderness, deformity or signs of injury  Normal range of motion  Cervical back: Normal range of motion and neck supple  No rigidity  Right lower leg: No edema  Left lower leg: No edema  Comments: No calf tenderness or unilateral leg swelling  Skin:     General: Skin is warm and dry  Coloration: Skin is not jaundiced  Findings: No rash  Neurological:      General: No focal deficit present  Mental Status: She is alert and oriented to person, place, and time  Sensory: No sensory deficit  Motor: Motor function is intact     Psychiatric:         Mood and Affect: Mood normal          Behavior: Behavior normal          Vital Signs  ED Triage Vitals   Temperature Pulse Respirations Blood Pressure SpO2   12/07/22 1745 12/07/22 1745 12/07/22 1745 12/07/22 1745 12/07/22 1745   98 2 °F (36 8 °C) (!) 107 18 160/65 99 %      Temp Source Heart Rate Source Patient Position - Orthostatic VS BP Location FiO2 (%)   12/07/22 1745 12/07/22 1745 12/07/22 1745 12/07/22 1745 --   Oral Monitor Sitting Right arm       Pain Score       12/07/22 2058       1           Vitals:    12/07/22 1745 12/07/22 2058   BP: 160/65 145/83   Pulse: (!) 107 73   Patient Position - Orthostatic VS: Sitting Sitting         Visual Acuity      ED Medications  Medications   iohexol (OMNIPAQUE) 350 MG/ML injection (SINGLE-DOSE) 100 mL (100 mL Intravenous Given 12/7/22 2204)   iohexol (OMNIPAQUE) 300 mg/mL injection 35 mL (35 mL Oral Given 12/7/22 2204)       Diagnostic Studies  Results Reviewed     Procedure Component Value Units Date/Time    POCT urinalysis dipstick [[id number]]     Lab Status: No result Specimen: Urine, Other     Comprehensive metabolic panel [[id number]]  (Abnormal) Collected: 12/07/22 1806    Lab Status: Final result Specimen: Blood from Arm, Left Updated: 12/07/22 1848     Sodium 137 mmol/L Potassium 4 3 mmol/L      Chloride 101 mmol/L      CO2 29 mmol/L      ANION GAP 7 mmol/L      BUN 12 mg/dL      Creatinine 0 72 mg/dL      Glucose 96 mg/dL      Calcium 8 9 mg/dL      AST 15 U/L      ALT 18 U/L      Alkaline Phosphatase 42 U/L      Total Protein 6 9 g/dL      Albumin 4 0 g/dL      Total Bilirubin 0 28 mg/dL      eGFR 107 ml/min/1 73sq m     Narrative:      National Kidney Disease Foundation guidelines for Chronic Kidney Disease (CKD):   •  Stage 1 with normal or high GFR (GFR > 90 mL/min/1 73 square meters)  •  Stage 2 Mild CKD (GFR = 60-89 mL/min/1 73 square meters)  •  Stage 3A Moderate CKD (GFR = 45-59 mL/min/1 73 square meters)  •  Stage 3B Moderate CKD (GFR = 30-44 mL/min/1 73 square meters)  •  Stage 4 Severe CKD (GFR = 15-29 mL/min/1 73 square meters)  •  Stage 5 End Stage CKD (GFR <15 mL/min/1 73 square meters)  Note: GFR calculation is accurate only with a steady state creatinine    Lipase [[id number]]  (Normal) Collected: 12/07/22 1806    Lab Status: Final result Specimen: Blood from Arm, Left Updated: 12/07/22 1848     Lipase 109 u/L     Fingerstick Glucose (POCT) [[id number]]  (Normal) Collected: 12/07/22 1822    Lab Status: Final result Updated: 12/07/22 1823     POC Glucose 82 mg/dl     CBC and differential [[id number]] Collected: 12/07/22 1806    Lab Status: Final result Specimen: Blood from Arm, Left Updated: 12/07/22 1823     WBC 8 41 Thousand/uL      RBC 4 24 Million/uL      Hemoglobin 14 0 g/dL      Hematocrit 41 7 %      MCV 98 fL      MCH 33 0 pg      MCHC 33 6 g/dL      RDW 12 2 %      MPV 9 1 fL      Platelets 606 Thousands/uL      nRBC 0 /100 WBCs      Neutrophils Relative 64 %      Immat GRANS % 0 %      Lymphocytes Relative 27 %      Monocytes Relative 7 %      Eosinophils Relative 1 %      Basophils Relative 1 %      Neutrophils Absolute 5 42 Thousands/µL      Immature Grans Absolute 0 01 Thousand/uL      Lymphocytes Absolute 2 27 Thousands/µL      Monocytes Absolute 0 60 Thousand/µL      Eosinophils Absolute 0 04 Thousand/µL      Basophils Absolute 0 07 Thousands/µL     POCT urinalysis dipstick [[id number]]  (Abnormal) Resulted: 12/07/22 1816    Lab Status: Final result Updated: 12/07/22 1821     Color, UA --     Clarity, UA --     EXT Leukocytes, UA --     Nitrite, UA --     Protein, UA -- mg/dl      Glucose, UA --     Ketones, UA -- mg/dl      EXT Urobilinogen, UA --      Bilirubin, UA --     Blood, UA --    Urine Macroscopic, POC [[id number]]  (Abnormal) Collected: 12/07/22 1818    Lab Status: Final result Specimen: Urine Updated: 12/07/22 1819     Color, UA Yellow     Clarity, UA Clear     pH, UA 5 5     Leukocytes, UA Negative     Nitrite, UA Negative     Protein, UA Negative mg/dl      Glucose,  (1/2%) mg/dl      Ketones, UA Negative mg/dl      Urobilinogen, UA 0 2 E U /dl      Bilirubin, UA Negative     Occult Blood, UA Negative     Specific Gravity, UA 1 010    Narrative:      CLINITEK RESULT                 US pelvis complete w transvaginal   Final Result by Sinan Castillo DO (12/07 2212)       Status post partial hysterectomy  Complex lesions within the right ovary for which follow-up ultrasound in 8-12 weeks is recommended  Workstation performed: [id number]         CT abdomen pelvis with contrast   Final Result by Donovan Carranza MD (12/07 2223)      1  Collapsed cyst or follicle in the right ovary  No fluid in the pelvis  2   Normal appendix  Significant amount stool throughout the colon  No evidence of bowel obstruction, colitis or diverticulitis  Workstation performed: [id number]                    Procedures  Procedures         ED Course                               SBIRT 20yo+    Flowsheet Row Most Recent Value   SBIRT (23 yo +)    In order to provide better care to our patients, we are screening all of our patients for alcohol and drug use  Would it be okay to ask you these screening questions?  No Filed at: 12/07/2022 2055                    [facility]  Number of Diagnoses or Management Options  Diagnosis management comments: And does not have an acute abdomen  CT was negative for appendicitis  No kidney stone  No bowel obstruction  Patient did have a right ovarian cyst   No torsion  Radiologist did describe an ovarian lesion that required follow-up  Patient was made aware of this, and the rare possibility that this could be malignancy  Otherwise recommended close follow-up with OB/GYN  Motrin for pain       Amount and/or Complexity of Data Reviewed  Clinical lab tests: ordered and reviewed  Tests in the radiology section of CPT®: ordered and reviewed        Disposition  Final diagnoses:   Right ovarian cyst     Time reflects when diagnosis was documented in both MDM as applicable and the Disposition within this note     Time User Action Codes Description Comment    12/7/2022 10:54 PM Leeannreddy Jaclyn Add [J62 [id number]] Right ovarian cyst       ED Disposition     ED Disposition   Discharge    Condition   Stable    Date/Time   Wed Dec 7, 2022 10:54 PM    [address] [place] [address] discharge to home/self care  Follow-up Information     Follow up With Specialties Details Why Contact Info Additional [id number] [facility] Gyne[facility] In 1 week  [address] [facility]  [address] [facility] [address]  [address], [address] [phone]          Patient's Medications   Discharge Prescriptions    No medications on file       No discharge procedures on file      PDMP Review     None          ED Provider  Electronically Signed by           Petrona Mir MD  12/07/22 1774

## 2022-12-09 ENCOUNTER — OFFICE VISIT (OUTPATIENT)
Dept: GYNECOLOGY | Facility: CLINIC | Age: 37
End: 2022-12-09

## 2022-12-09 VITALS
WEIGHT: 128 LBS | SYSTOLIC BLOOD PRESSURE: 138 MMHG | HEIGHT: 63 IN | BODY MASS INDEX: 22.68 KG/M2 | HEART RATE: 108 BPM | DIASTOLIC BLOOD PRESSURE: 78 MMHG

## 2022-12-09 DIAGNOSIS — R10.2 PELVIC PAIN: ICD-10-CM

## 2022-12-09 DIAGNOSIS — N83.201 CYST OF RIGHT OVARY: Primary | ICD-10-CM

## 2022-12-09 RX ORDER — NAPROXEN SODIUM 550 MG/1
550 TABLET ORAL 2 TIMES DAILY WITH MEALS
Qty: 30 TABLET | Refills: 0 | Status: SHIPPED | OUTPATIENT
Start: 2022-12-09 | End: 2022-12-24

## 2022-12-09 NOTE — PROGRESS NOTES
Assessment/Plan:         Diagnoses and all orders for this visit:    Cyst of right ovary; suspect ruptured corpus luteum cyst   She will return to the office in 3 to 4 weeks for repeat ultrasound  Should the pain increase she will return to the office    Pelvic pain  -     naproxen sodium (ANAPROX) 550 mg tablet; Take 1 tablet (550 mg total) by mouth 2 (two) times a day with meals for 30 doses        Subjective:      Patient ID: Ashwini Barnett is a 40 y o  female  HPI patient is status post tubal and excision of endometriosis in 2014  Following that she had a robotic TLH BS with lysis of adhesions (extensive according to patient (   This was done in [facility] in 2015  Due to persistent pain she underwent a repeat laparoscopy which revealed minimal adhesions  Patient presented to the emergency room on December 7 stating she had several days of right lower quadrant abdominal pain  This was worse with walking  There was no associated nausea or vomiting according to the emergency room note  Patient however states she did have nausea vomiting and diarrhea the day prior to her presentation to the emergency room  She denied any dysuria, hematuria urgency  She has had increased frequency of urination  Denied any fever  Denied any vaginal discharge or bleeding  Patient has not taken anything for the discomfort  CBC was normal   Urine analysis also normal  A CT scan revealed a collapsed cyst or follicle on the right ovary  No fluid in the pelvis  The appendix appeared normal     Ultrasound revealed the right ovary to measure 3 3 x 2 6 x 2 8 cm  There was a complex right-sided ovarian cyst measuring 1 4 x 1 2 x 1 3 cm  There was an isoechoic right-sided ovarian lesion measuring 2 1 x 1 4 x 1 7 cm  Left ovary revealed a mildly complex follicular cyst measuring 0 9 x 1 2 x 1 1 cm  Since the emergency room visit the nausea vomiting diarrhea has subsided    The pain is minimal across her lower abdomen greater on the right side  The following portions of the patient's history were reviewed and updated as appropriate:   She  has a past medical history of Abdominal pain, Abnormal MRI, cervical spine, Acne, Back pain, chronic, Chronic pain disorder, Constipation, Fatigue, Hemorrhoids, Loss of weight, Migraines, Nausea with vomiting, PONV (postoperative nausea and vomiting), and Tear of left rotator cuff  She   Patient Active Problem List    Diagnosis Date Noted   • Abscess 2022   • Dermatitis 2021   • Breast pain 2021   • Cervical adenopathy 2021   • Inguinal adenopathy 2021   • Axillary adenopathy 2021   • Generalized abdominal pain 2021   • Cervical lymphadenopathy    • Folliculitis    • Hair loss 2021   • Non-recurrent acute suppurative otitis media of both ears 2019   • Epidermoid cyst 2019   • Cervical strain 2019   • Thoracic myofascial strain 2019   • Otitis externa 10/10/2018   • Chronic otitis media with serous effusion 10/10/2018   • Cubital tunnel syndrome on right 2018   • S/P cubital tunnel release 2018   • Cubital tunnel syndrome of both upper extremities 2018   • Migraine with aura and without status migrainosus 2018   • Left carpal tunnel syndrome 2018   • Vasovagal syncope 2018   • Anxiety 2018   • Chiari malformation type I ([address] ) 2018     She  has a past surgical history that includes  section; Hernia repair; Endometrial cryoablation; Hysterectomy; Tubal ligation; Shoulder surgery (Left); Colonoscopy (N/A, 2016); pr revise ulnar nerve at elbow (Left, 2018); and pr revise ulnar nerve at elbow (Right, 2018)    Her family history includes Alcohol abuse in her father; Breast cancer (age of onset: 45) in her maternal aunt; Breast cancer (age of onset: 48) in her other; Cancer in her father; Cervical cancer in her maternal grandmother; Cirrhosis in her father; Colon cancer in her maternal grandmother; Diabetes in her maternal grandfather; Melanoma in her maternal grandmother; No Known Problems in her brother, daughter, maternal aunt, maternal aunt, maternal uncle, paternal grandfather, paternal grandmother, paternal uncle, sister, sister, sister, sister, and son; Ovarian cancer in her maternal grandmother; Ovarian cancer (age of onset: 36) in her mother; Throat cancer in her father  She  reports that she has been smoking cigarettes  She has never used smokeless tobacco  She reports that she does not drink alcohol and does not use drugs  Current Outpatient Medications   Medication Sig Dispense Refill   • Clindamycin Phos-Benzoyl Perox gel Apply 1 application topically 2 (two) times a day 50 g 3   • doxycycline (ADOXA) 50 MG tablet Take 50 mg by mouth 2 (two) times a day (Patient not taking: Reported on 5/10/2022 )     • hydroxychloroquine (PLAQUENIL) 200 mg tablet Take once daily with full glass of water 30 tablet 3   • naproxen sodium (ANAPROX) 550 mg tablet Take 1 tablet (550 mg total) by mouth 2 (two) times a day with meals for 30 doses 30 tablet 0   • spironolactone (ALDACTONE) 100 mg tablet Take 1 tablet (100 mg total) by mouth daily With full glass of water 30 tablet 5     No current facility-administered medications for this visit  Current Outpatient Medications on File Prior to Visit   Medication Sig   • Clindamycin Phos-Benzoyl Perox gel Apply 1 application topically 2 (two) times a day   • doxycycline (ADOXA) 50 MG tablet Take 50 mg by mouth 2 (two) times a day (Patient not taking: Reported on 5/10/2022 )   • hydroxychloroquine (PLAQUENIL) 200 mg tablet Take once daily with full glass of water   • spironolactone (ALDACTONE) 100 mg tablet Take 1 tablet (100 mg total) by mouth daily With full glass of water     No current facility-administered medications on file prior to visit  She has No Known Allergies       Review of Systems   Gastrointestinal:        See HPI   Genitourinary:        See HPI         Objective:      /78   Pulse (!) 108   Ht 5' 3" (1 6 m)   Wt 58 1 kg (128 lb)   BMI 22 67 kg/m²          Physical Exam  Abdominal:      General: There is no distension  Palpations: Abdomen is soft  There is no mass  Tenderness: There is abdominal tenderness (Minimal tenderness across lower quadrants)  There is no guarding or rebound  Hernia: No hernia is present  There is no hernia in the left inguinal area or right inguinal area  Genitourinary:     General: Normal vulva  Labia:         Right: No rash, tenderness or lesion  Left: No rash, tenderness or lesion  Vagina: Normal       Uterus: Absent  Adnexa:         Right: Tenderness present  No mass or fullness  Left: No mass, tenderness or fullness  Lymphadenopathy:      Lower Body: No right inguinal adenopathy  No left inguinal adenopathy

## 2023-01-04 ENCOUNTER — ULTRASOUND (OUTPATIENT)
Dept: GYNECOLOGY | Facility: CLINIC | Age: 38
End: 2023-01-04

## 2023-01-04 ENCOUNTER — OFFICE VISIT (OUTPATIENT)
Dept: GYNECOLOGY | Facility: CLINIC | Age: 38
End: 2023-01-04

## 2023-01-04 DIAGNOSIS — N83.201 BILATERAL OVARIAN CYSTS: Primary | ICD-10-CM

## 2023-01-04 DIAGNOSIS — N83.201 RIGHT OVARIAN CYST: Primary | ICD-10-CM

## 2023-01-04 DIAGNOSIS — N80.9 ENDOMETRIOSIS: ICD-10-CM

## 2023-01-04 DIAGNOSIS — N83.202 BILATERAL OVARIAN CYSTS: Primary | ICD-10-CM

## 2023-01-04 NOTE — PROGRESS NOTES
Assessment/Plan:         Diagnoses and all orders for this visit:    Bilateral ovarian cysts/suspect bilateral endometriomas: Discussed options including following versus medical management versus further surgical management  Plan will be to start on Orilissa 200 mg twice daily up to 6 months  We will recheck an ultrasound in 3 months  Subjective:      Patient ID: Radha Mina is a 40 y o  female  HPI  Note from 12/9/22:   patient is status post tubal and excision of endometriosis in 2014  Following that she had a robotic TLH BS with lysis of adhesions (extensive according to patient (   This was done in [facility] in 2015  Due to persistent pain she underwent a repeat laparoscopy which revealed minimal adhesions  Patient presented to the emergency room on December 7 stating she had several days of right lower quadrant abdominal pain  This was worse with walking  There was no associated nausea or vomiting according to the emergency room note  Patient however states she did have nausea vomiting and diarrhea the day prior to her presentation to the emergency room  She denied any dysuria, hematuria urgency  She has had increased frequency of urination  Denied any fever  Denied any vaginal discharge or bleeding  Patient has not taken anything for the discomfort  CBC was normal   Urine analysis also normal  A CT scan revealed a collapsed cyst or follicle on the right ovary  No fluid in the pelvis  The appendix appeared normal      Ultrasound revealed the right ovary to measure 3 3 x 2 6 x 2 8 cm  There was a complex right-sided ovarian cyst measuring 1 4 x 1 2 x 1 3 cm  There was an isoechoic right-sided ovarian lesion measuring 2 1 x 1 4 x 1 7 cm  Left ovary revealed a mildly complex follicular cyst measuring 0 9 x 1 2 x 1 1 cm      Since the emergency room visit the nausea vomiting diarrhea has subsided    The pain is minimal across her lower abdomen greater on the right side     Advised return to the office for repeat vaginal ultrasound:      Date/Time: 1/4/2023 7:07 AM  Performed by: Shant Briseno  Authorized by: DO Swapnil Delaney Protocol:  Consent: Verbal consent obtained  Consent given by: patient  Patient identity confirmed: verbally with patient        Procedure details:     Technique:  Transvaginal US, Non-OB    Position: lithotomy exam    Left ovary findings:     Left ovary:  Visualized    Length (cm): 2 06    Height (cm): 1 55    Width (cm): 1 77  Right ovary findings:     Right ovary:  Visualized    Length (cm): 3 99    Height (cm): 2 53    Width (cm): 2 59  Other findings:     Free pelvic fluid: not identified      Free peritoneal fluid: not identified    Post-Procedure Details:     Impression:  The uterus has been surgically removed  Thr right ovary demonstrates a complex cystic mass 1 7cm (previously 1 4cm) and an echogenic mass 1 9cm (previously 2 1cm), both fairly stable  The left ovary demonstrates a complex cystic mass which appears to contain debris, 1 4cm (previously 1 2cm) also fairly stable  Each ovary appears to demonstrate blood flow on color doppler  No free fluid  Tolerance: Tolerated well, no immediate complications    Complications: no complications     Patient states today that she continues to experience pelvic pain and lower back pain  This has been minimally responsive to naproxen  The following portions of the patient's history were reviewed and updated as appropriate:   She  has a past medical history of Abdominal pain, Abnormal MRI, cervical spine, Acne, Back pain, chronic, Chronic pain disorder, Constipation, Fatigue, Hemorrhoids, Loss of weight, Migraines, Nausea with vomiting, PONV (postoperative nausea and vomiting), and Tear of left rotator cuff    She   Patient Active Problem List    Diagnosis Date Noted   • Abscess 03/11/2022   • Dermatitis 03/18/2021   • Breast pain 02/12/2021   • Cervical adenopathy 2021   • Inguinal adenopathy 2021   • Axillary adenopathy 2021   • Generalized abdominal pain 2021   • Cervical lymphadenopathy    • Folliculitis    • Hair loss 2021   • Non-recurrent acute suppurative otitis media of both ears 2019   • Epidermoid cyst 2019   • Cervical strain 2019   • Thoracic myofascial strain 2019   • Otitis externa 10/10/2018   • Chronic otitis media with serous effusion 10/10/2018   • Cubital tunnel syndrome on right 2018   • S/P cubital tunnel release 2018   • Cubital tunnel syndrome of both upper extremities 2018   • Migraine with aura and without status migrainosus 2018   • Left carpal tunnel syndrome 2018   • Vasovagal syncope 2018   • Anxiety 2018   • Chiari malformation type I ([facility] [address] ) 2018     She  has a past surgical history that includes  section; Hernia repair; Endometrial cryoablation; Hysterectomy; Tubal ligation; Shoulder surgery (Left); Colonoscopy (N/A, 2016); pr neuroplasty &/transposition ulnar nerve elbow (Left, 2018); and pr neuroplasty &/transposition ulnar nerve elbow (Right, 2018)  Her family history includes Alcohol abuse in her father; Breast cancer (age of onset: 45) in her maternal aunt; Breast cancer (age of onset: 48) in her other; Cancer in her father; Cervical cancer in her maternal grandmother; Cirrhosis in her father; Colon cancer in her maternal grandmother; Diabetes in her maternal grandfather; Melanoma in her maternal grandmother; No Known Problems in her brother, daughter, maternal aunt, maternal aunt, maternal uncle, paternal grandfather, paternal grandmother, paternal uncle, sister, sister, sister, sister, and son; Ovarian cancer in her maternal grandmother; Ovarian cancer (age of onset: 36) in her mother; Throat cancer in her father  She  reports that she has been smoking cigarettes   She has never used smokeless tobacco  She reports that she does not drink alcohol and does not use drugs  Current Outpatient Medications   Medication Sig Dispense Refill   • Clindamycin Phos-Benzoyl Perox gel Apply 1 application topically 2 (two) times a day 50 g 3   • doxycycline (ADOXA) 50 MG tablet Take 50 mg by mouth 2 (two) times a day (Patient not taking: Reported on 5/10/2022 )     • hydroxychloroquine (PLAQUENIL) 200 mg tablet Take once daily with full glass of water 30 tablet 3   • naproxen sodium (ANAPROX) 550 mg tablet Take 1 tablet (550 mg total) by mouth 2 (two) times a day with meals for 30 doses 30 tablet 0   • spironolactone (ALDACTONE) 100 mg tablet Take 1 tablet (100 mg total) by mouth daily With full glass of water 30 tablet 5     No current facility-administered medications for this visit  Current Outpatient Medications on File Prior to Visit   Medication Sig   • Clindamycin Phos-Benzoyl Perox gel Apply 1 application topically 2 (two) times a day   • doxycycline (ADOXA) 50 MG tablet Take 50 mg by mouth 2 (two) times a day (Patient not taking: Reported on 5/10/2022 )   • hydroxychloroquine (PLAQUENIL) 200 mg tablet Take once daily with full glass of water   • naproxen sodium (ANAPROX) 550 mg tablet Take 1 tablet (550 mg total) by mouth 2 (two) times a day with meals for 30 doses   • spironolactone (ALDACTONE) 100 mg tablet Take 1 tablet (100 mg total) by mouth daily With full glass of water     No current facility-administered medications on file prior to visit  She has No Known Allergies       Review of Systems      Objective: There were no vitals taken for this visit           Physical Exam

## 2023-01-04 NOTE — PROGRESS NOTES
AMB US Pelvic Non OB    Date/Time: 1/4/2023 7:07 AM  Performed by: Christi Mane  Authorized by: Jyoti Sherman DO   Universal Protocol:  Consent: Verbal consent obtained  Consent given by: patient  Patient identity confirmed: verbally with patient      Procedure details:     Technique:  Transvaginal US, Non-OB    Position: lithotomy exam    Left ovary findings:     Left ovary:  Visualized    Length (cm): 2 06    Height (cm): 1 55    Width (cm): 1 77  Right ovary findings:     Right ovary:  Visualized    Length (cm): 3 99    Height (cm): 2 53    Width (cm): 2 59  Other findings:     Free pelvic fluid: not identified      Free peritoneal fluid: not identified    Post-Procedure Details:     Impression:  The uterus has been surgically removed  Thr right ovary demonstrates a complex cystic mass 1 7cm (previously 1 4cm) and an echogenic mass 1 9cm (previously 2 1cm), both fairly stable  The left ovary demonstrates a complex cystic mass which appears to contain debris, 1 4cm (previously 1 2cm) also fairly stable  Each ovary appears to demonstrate blood flow on color doppler  No free fluid  Tolerance: Tolerated well, no immediate complications    Complications: no complications    Additional Procedure Comments:      GE Voluson P8 transvaginal transducer RIC5-RA with Serial Number [id number] was used during procedure and subsequently cleaned with high level disinfection utilizing the [employer]       Ultrasound performed at:     [facility] for [address]  [address], [address]  Phone: [phone]  Fax:  [phone]

## 2023-01-05 RX ORDER — ELAGOLIX 200 MG/1
TABLET, FILM COATED ORAL
Qty: 168 TABLET | Refills: 1 | Status: SHIPPED | OUTPATIENT
Start: 2023-01-05

## 2023-01-12 ENCOUNTER — TELEPHONE (OUTPATIENT)
Dept: GYNECOLOGY | Facility: CLINIC | Age: 38
End: 2023-01-12

## 2023-01-19 ENCOUNTER — TELEPHONE (OUTPATIENT)
Dept: GYNECOLOGY | Facility: CLINIC | Age: 38
End: 2023-01-19

## 2023-01-19 ENCOUNTER — APPOINTMENT (OUTPATIENT)
Dept: LAB | Facility: HOSPITAL | Age: 38
End: 2023-01-19

## 2023-01-19 DIAGNOSIS — R39.9 UTI SYMPTOMS: Primary | ICD-10-CM

## 2023-01-19 LAB
BACTERIA UR QL AUTO: ABNORMAL /HPF
BILIRUB UR QL STRIP: NEGATIVE
CLARITY UR: CLEAR
COLOR UR: YELLOW
GLUCOSE UR STRIP-MCNC: ABNORMAL MG/DL
HGB UR QL STRIP.AUTO: NEGATIVE
KETONES UR STRIP-MCNC: NEGATIVE MG/DL
LEUKOCYTE ESTERASE UR QL STRIP: ABNORMAL
MUCOUS THREADS UR QL AUTO: ABNORMAL
NITRITE UR QL STRIP: NEGATIVE
NON-SQ EPI CELLS URNS QL MICRO: ABNORMAL /HPF
PH UR STRIP.AUTO: 6.5 [PH]
PROT UR STRIP-MCNC: NEGATIVE MG/DL
RBC #/AREA URNS AUTO: ABNORMAL /HPF
SP GR UR STRIP.AUTO: 1.02 (ref 1–1.03)
UROBILINOGEN UR QL STRIP.AUTO: 1 E.U./DL
WBC #/AREA URNS AUTO: ABNORMAL /HPF

## 2023-01-19 PROCEDURE — 81001 URINALYSIS AUTO W/SCOPE: CPT

## 2023-01-19 PROCEDURE — 87086 URINE CULTURE/COLONY COUNT: CPT

## 2023-01-19 NOTE — TELEPHONE ENCOUNTER
Patient called and states she is having lower back pain and terrible heart burn, to the point that even water is bothering her  Also, not emptying bladder fully  She states she can urinate 5 times at one bathroom visit  She is wondering if this is due to growth of cysts? She also states she is having nosebleeds that she has never had before, 3 in the last two weeks  She does understand that this may not be gyn related but wanted to bring this to your attention    Please advise

## 2023-01-20 ENCOUNTER — TELEPHONE (OUTPATIENT)
Dept: GYNECOLOGY | Facility: CLINIC | Age: 38
End: 2023-01-20

## 2023-01-20 LAB — BACTERIA UR CULT: NORMAL

## 2023-01-20 NOTE — TELEPHONE ENCOUNTER
Spoke to Carmen at [facility]   Lab urine culture was done results were no growth   ----- Message from Tiara Poster, DO sent at 1/20/2023  7:46 AM EST -----  Was C & S done on this specimen ?  If not repeat UA C & S

## 2023-01-24 ENCOUNTER — DOCUMENTATION (OUTPATIENT)
Dept: GYNECOLOGY | Facility: CLINIC | Age: 38
End: 2023-01-24

## 2023-01-24 DIAGNOSIS — N80.9 ENDOMETRIOSIS: Primary | ICD-10-CM

## 2023-01-24 NOTE — PROGRESS NOTES
lupron not covered under RX plan,  need to send info for medical plan coverage  Will send to [facility] and get  auth for availity

## 2023-01-25 ENCOUNTER — DOCUMENTATION (OUTPATIENT)
Dept: GYNECOLOGY | Facility: CLINIC | Age: 38
End: 2023-01-25

## 2023-01-25 ENCOUNTER — TELEPHONE (OUTPATIENT)
Dept: GYNECOLOGY | Facility: CLINIC | Age: 38
End: 2023-01-25

## 2023-01-25 NOTE — PROGRESS NOTES
pts RX plan will not cover Lupron  sent for auth for medical plan no auth needed  Sent RX to [facility] to order they will process and send us info if covered      Sent RX to them 1/25

## 2023-01-25 NOTE — TELEPHONE ENCOUNTER
LM and relayed message    ----- Message from Armando Nicholson DO sent at 1/24/2023  2:16 PM EST -----  Regarding: FW: Urine Sample  Contact: [phone]  Urine culture negative  Start Jacqueline Redder for suspected endometriosis  ----- Message -----  From: Mary Vargas  Sent: 1/24/2023   1:02 PM EST  To: Armando Nicholson DO  Subject: FW: Urine Sample                                   ----- Message -----  From: Mario Martinez  Sent: 1/24/2023  12:36 PM EST  To: [facility]  Subject: Urine Sample                                     Good afternoon,  I am just checking in on my test results from last week  Also, would like to know if the medication would be started soon? Thanks so much,  [email or web address]

## 2023-02-01 ENCOUNTER — HOSPITAL ENCOUNTER (OUTPATIENT)
Dept: ULTRASOUND IMAGING | Facility: CLINIC | Age: 38
Discharge: HOME | End: 2023-02-01

## 2023-02-01 VITALS — HEIGHT: 63 IN | BODY MASS INDEX: 22.68 KG/M2 | WEIGHT: 128 LBS

## 2023-02-01 DIAGNOSIS — N80.9 ENDOMETRIOSIS: ICD-10-CM

## 2023-02-01 DIAGNOSIS — R92.8 ABNORMAL FINDINGS ON DIAGNOSTIC IMAGING OF BREAST: ICD-10-CM

## 2023-02-07 DIAGNOSIS — R10.2 PELVIC PAIN: ICD-10-CM

## 2023-02-07 DIAGNOSIS — N80.9 ENDOMETRIOSIS: Primary | ICD-10-CM

## 2023-02-14 ENCOUNTER — DOCUMENTATION (OUTPATIENT)
Dept: GYNECOLOGY | Facility: CLINIC | Age: 38
End: 2023-02-14

## 2023-02-14 NOTE — PROGRESS NOTES
SENT to [facility] pt insurance info   No auth needed for medical plan  Not covered under her RX plan     Faxed order to them and now they have questions about coverage

## 2023-03-29 NOTE — PROGRESS NOTES
AMB US Pelvic Non OB    Date/Time: 4/5/2023 8:36 AM  Performed by: Hui Myles  Authorized by: Yasmin Brantley DO   Universal Protocol:  Patient identity confirmed: verbally with patient      Procedure details:     Technique:  Transvaginal US, Non-OB    Position: lithotomy exam    Left ovary findings:     Left ovary:  Visualized    Length (cm): 2 41    Height (cm): 1 61    Width (cm): 1 76  Right ovary findings:     Right ovary:  Visualized    Length (cm): 2 67    Height (cm): 1 67    Width (cm): 1 74  Other findings:     Free pelvic fluid: not identified      Free peritoneal fluid: not identified    Post-Procedure Details:     Impression:  The uterus has been surgically removed  The bilateral ovaries appear within normal limits  No free fluid  Tolerance: Tolerated well, no immediate complications    Complications: no complications    Additional Procedure Comments:      GE Voluson P8 transvaginal transducer RIC5-RA with Serial Number [id number] was used during procedure and subsequently cleaned with high level disinfection utilizing the [employer]       Ultrasound performed at:     [address] [facility] [address]  [address], [address]   Phone: [phone]  Fax:  [phone]

## 2023-04-05 ENCOUNTER — OFFICE VISIT (OUTPATIENT)
Dept: GYNECOLOGY | Facility: CLINIC | Age: 38
End: 2023-04-05

## 2023-04-05 ENCOUNTER — ULTRASOUND (OUTPATIENT)
Dept: GYNECOLOGY | Facility: CLINIC | Age: 38
End: 2023-04-05

## 2023-04-05 DIAGNOSIS — N83.201 BILATERAL OVARIAN CYSTS: Primary | ICD-10-CM

## 2023-04-05 DIAGNOSIS — N83.8 MASS, OVARIAN: Primary | ICD-10-CM

## 2023-04-05 DIAGNOSIS — N83.202 BILATERAL OVARIAN CYSTS: Primary | ICD-10-CM

## 2023-04-05 DIAGNOSIS — N80.9 ENDOMETRIOSIS: ICD-10-CM

## 2023-04-05 NOTE — PROGRESS NOTES
Patient is status post tubal and excision of endometriosis in 2014  Following that she had a robotic TLH BS with lysis of extensive adhesions done at [facility] in 2015  Due to persistent pain she underwent a repeat laparoscopy which revealed minimal adhesions  Patient presented to the emergency room on December 7 stating that she had several days of right lower quadrant pain  CT scan revealed a collapsed cyst or follicle on the right ovary with no fluid in the pelvis the appendix appeared normal   Ultrasound revealed the right ovary to measure 3 3 x 2 6 x 2 8 cm with a complex right-sided ovarian cyst measuring 1 4 x 1 2 x 1 3 cm there was an isoechoic right-sided ovarian lesion measuring 2 1 x 1 4 x 1 7 cm  Left ovary revealed a mildly complex follicular cyst measuring 0 9 x 1 2 x 1 1 cm  When seen in the office on January 1 we discussed my suspicion for recurrent endometriosis  We discussed options at that time  Patient had opted to proceed with Deuce  She presents to the office today for repeat ultrasound    AMB US Pelvic Non OB     Date/Time: 4/5/2023 8:36 AM  Performed by: Chris Kelly  Authorized by: Cris Bush DO   Universal Protocol:  Patient identity confirmed: verbally with patient        Procedure details:     Technique:  Transvaginal US, Non-OB    Position: lithotomy exam    Left ovary findings:     Left ovary:  Visualized    Length (cm): 2 41    Height (cm): 1 61    Width (cm): 1 76  Right ovary findings:     Right ovary:  Visualized    Length (cm): 2 67    Height (cm): 1 67    Width (cm): 1 74  Other findings:     Free pelvic fluid: not identified      Free peritoneal fluid: not identified    Post-Procedure Details:     Impression:  The uterus has been surgically removed  The bilateral ovaries appear within normal limits  No free fluid  Tolerance:   Tolerated well, no immediate complications    Complications: no complications     Impression: Bilateral ovarian cysts-resolved  Plan: Reviewed ultrasound findings with patient  She will discontinue Orilissa at this point    If pain returns she will return to the office

## 2023-07-28 ENCOUNTER — TELEPHONE (OUTPATIENT)
Dept: OBGYN CLINIC | Facility: CLINIC | Age: 38
End: 2023-07-28

## 2023-07-28 NOTE — TELEPHONE ENCOUNTER
I called pt to get ins info. She said she is having issues with her [employer]. They are suddenly not verifying with providers. She is trying to get it sorted out and waited until the last minute to cancel Len Jacques appt because she waited so long for it. I told her I'd put this note in to try, if possible, to get her in asap when she calls back to cherise.

## 2024-02-21 PROBLEM — H66.003 NON-RECURRENT ACUTE SUPPURATIVE OTITIS MEDIA OF BOTH EARS: Status: RESOLVED | Noted: 2019-07-29 | Resolved: 2024-02-21

## 2024-04-24 ENCOUNTER — HOSPITAL ENCOUNTER (OUTPATIENT)
Dept: RADIOLOGY | Facility: HOSPITAL | Age: 39
Discharge: HOME | End: 2024-04-24
Attending: ORTHOPAEDIC SURGERY
Payer: COMMERCIAL

## 2024-04-24 ENCOUNTER — OFFICE VISIT (OUTPATIENT)
Dept: OBGYN CLINIC | Facility: CLINIC | Age: 39
End: 2024-04-24
Payer: COMMERCIAL

## 2024-04-24 ENCOUNTER — TELEPHONE (OUTPATIENT)
Dept: OBGYN CLINIC | Facility: CLINIC | Age: 39
End: 2024-04-24

## 2024-04-24 VITALS
HEIGHT: 63 IN | SYSTOLIC BLOOD PRESSURE: 142 MMHG | HEART RATE: 82 BPM | BODY MASS INDEX: 22.68 KG/M2 | WEIGHT: 128 LBS | DIASTOLIC BLOOD PRESSURE: 88 MMHG

## 2024-04-24 DIAGNOSIS — M25.532 BILATERAL WRIST PAIN: ICD-10-CM

## 2024-04-24 DIAGNOSIS — G56.02 LEFT CARPAL TUNNEL SYNDROME: ICD-10-CM

## 2024-04-24 DIAGNOSIS — M25.531 PAIN IN RIGHT WRIST: ICD-10-CM

## 2024-04-24 DIAGNOSIS — M25.531 BILATERAL WRIST PAIN: ICD-10-CM

## 2024-04-24 DIAGNOSIS — M54.2 NECK PAIN: ICD-10-CM

## 2024-04-24 DIAGNOSIS — M25.521 BILATERAL ELBOW JOINT PAIN: ICD-10-CM

## 2024-04-24 DIAGNOSIS — M54.12 CERVICAL RADICULOPATHY: ICD-10-CM

## 2024-04-24 DIAGNOSIS — M25.522 BILATERAL ELBOW JOINT PAIN: ICD-10-CM

## 2024-04-24 DIAGNOSIS — G56.03 BILATERAL CARPAL TUNNEL SYNDROME: Primary | ICD-10-CM

## 2024-04-24 PROCEDURE — 72050 X-RAY EXAM NECK SPINE 4/5VWS: CPT

## 2024-04-24 PROCEDURE — 73110 X-RAY EXAM OF WRIST: CPT

## 2024-04-24 PROCEDURE — 99214 OFFICE O/P EST MOD 30 MIN: CPT | Performed by: ORTHOPAEDIC SURGERY

## 2024-04-24 NOTE — PROGRESS NOTES
[facility] [address] [address] [phone]       Scott Lock  [id number]  1985    ORTHOPAEDIC SURGERY OUTPATIENT NOTE  2024      HISTORY:  38 y.o. female, left-hand-dominant, presenting for evaluation of bilateral upper extremity elbow, wrist and hand pain.  She also notes associated hand weakness and decreased sensation bilaterally..  Past medical history is significant for bilateral cubital tunnel releases, right 2018, left 2018.  She reports that she was symptom-free following her bilateral cubital tunnel releases and that her current symptoms began approximately 2 years ago.  Left is worse than right.  Strength and strength drinks bilateral upper extremity pain, specifically hand/wrist, 6/10 in severity.  She has worn bilateral nighttime splints which gave her some symptomatic relief regarding carpal tunnel syndrome.  She notes that she has been dropping glasses at work more frequently over the last couple months.  She does have mild upper back/neck pain on the left.  She works as a  and realtor.    Past Medical History:   Diagnosis Date    Abdominal pain     Abnormal MRI, cervical spine     last assessed: 2016    Acne     Back pain, chronic     Chronic pain disorder     Constipation     Fatigue     Hemorrhoids     resolved: 2017    Loss of weight     Migraines     Nausea with vomiting     PONV (postoperative nausea and vomiting)     Tear of left rotator cuff     last assessed: 2016       Past Surgical History:   Procedure Laterality Date     SECTION      x 2    COLONOSCOPY N/A 2016    Procedure: COLONOSCOPY;  Surgeon: Ed Gomez MD;  Location: [facility];  Service:     ENDOMETRIAL CRYOABLATION      HERNIA REPAIR      HYSTERECTOMY      NM NEUROPLASTY &/TRANSPOSITION ULNAR NERVE ELBOW Left 2018    Procedure: RELEASE CUBITAL TUNNEL;  Surgeon:  Jerzy Kitchen MD;  Location: [facility];  Service: Orthopedics    [place] NEUROPLASTY &/TRANSPOSITION ULNAR NERVE ELBOW Right 9/20/2018    Procedure: RELEASE CUBITAL TUNNEL;  Surgeon: Jerzy Kitchen MD;  Location: [facility];  Service: Orthopedics    SHOULDER SURGERY Left     TUBAL LIGATION         Social History     Socioeconomic History    Marital status: /Civil Union     Spouse name: Not on file    Number of children: 3    Years of education: Not on file    Highest education level: Not on file   Occupational History    Occupation: employed   Tobacco Use    Smoking status: Every Day     Types: Cigarettes    Smokeless tobacco: Never    Tobacco comments:     Has cut down to 4 daily   Vaping Use    Vaping status: Never Used   Substance and Sexual Activity    Alcohol use: No    Drug use: No    Sexual activity: Yes   Other Topics Concern    Not on file   Social History Narrative    2 children (as per allcripts)     Social Determinants of Health     Financial Resource Strain: Not on file   Food Insecurity: Not on file   Transportation Needs: Not on file   Physical Activity: Not on file   Stress: Not on file   Social Connections: Not on file   Intimate Partner Violence: Not on file   Housing Stability: Not on file       Family History   Problem Relation Age of Onset    Ovarian cancer Mother 40    Cancer Father         throat cancer    Alcohol abuse Father         alcoholism    Cirrhosis Father         hepatic    Throat cancer Father     Diabetes Maternal Grandfather         melliltus    No Known Problems Sister     No Known Problems Daughter     Ovarian cancer Maternal Grandmother     Melanoma Maternal Grandmother     Cervical cancer Maternal Grandmother     Colon cancer Maternal Grandmother     No Known Problems Paternal Grandmother     No Known Problems Paternal Grandfather     No Known Problems Son     No Known Problems Sister     No Known Problems Sister     No Known Problems Sister     No Known Problems  "Brother     No Known Problems Maternal Aunt     Breast cancer Maternal Aunt 38    No Known Problems Maternal Aunt     No Known Problems Maternal Uncle     No Known Problems Paternal Uncle     Breast cancer Other 50        Patient's Medications   New Prescriptions    No medications on file   Previous Medications    CLINDAMYCIN PHOS-BENZOYL PEROX GEL    Apply 1 application topically 2 (two) times a day    DOXYCYCLINE (ADOXA) 50 MG TABLET    Take 50 mg by mouth 2 (two) times a day    HYDROXYCHLOROQUINE (PLAQUENIL) 200 MG TABLET    Take once daily with full glass of water    LEUPROLIDE (LUPRON DEPOT) 3.75 MG INJECTION    Inject 3.75 mg into a muscle every 28 days    NAPROXEN SODIUM (ANAPROX) 550 MG TABLET    Take 1 tablet (550 mg total) by mouth 2 (two) times a day with meals for 30 doses    ORILISSA 200 MG TABS    TAKE 1 TABLET BY MOUTH TWICE A DAY    SPIRONOLACTONE (ALDACTONE) 100 MG TABLET    Take 1 tablet (100 mg total) by mouth daily With full glass of water   Modified Medications    No medications on file   Discontinued Medications    No medications on file       No Known Allergies     /88 (BP Location: Left arm, Patient Position: Sitting, Cuff Size: Standard)   Pulse 82   Ht 5' 3\" (1.6 m)   Wt 58.1 kg (128 lb)   BMI 22.67 kg/m²      REVIEW OF SYSTEMS:  Constitutional: Negative.    HEENT: Negative.    Respiratory: Negative.    Skin: Negative.    Neurological: Negative.    Psychiatric/Behavioral: Negative.  Musculoskeletal: Negative except for that mentioned in the HPI.    /88 (BP Location: Left arm, Patient Position: Sitting, Cuff Size: Standard)   Pulse 82   Ht 5' 3\" (1.6 m)   Wt 58.1 kg (128 lb)   BMI 22.67 kg/m²   Gen: No acute distress, resting comfortably in bed  HEENT: Eyes clear, moist mucus membranes, hearing intact  Respiratory: No audible wheezing or stridor  Cardiovascular: Well Perfused peripherally, 2+ distal pulse  Abdomen: nondistended, no peritoneal signs    PHYSICAL EXAM: "     Right upper extremity:  Pain-free active range of motion shoulder and elbow  Tenderness to palpation posterior aspect of the elbow medial to the triceps insertion  No pain with resisted elbow or finger extension  Negative Tinel's     STRENGTH:  5/5  strength   Radial/median/ulnarnerve sensation intact    <2 sec cap refill  Durkan's test: Negative  Tinel's test: Negative  Phalen's test: Negative  Median Nerve: Normal  Ulnar Nerve: Normal  Thenar Muscle atrophy: Negative  Hypothenar Muscle atrophy: Negative    Negative Spurling test    Left upper extremity:   Pain-free active range of motion shoulder and elbow  Tenderness to palpation posterior aspect of the elbow medial to the triceps insertion  No pain with resisted elbow or finger extension  Negative Tinel's     STRENGTH:  4/5  strength   Radial/median/ulnarnerve sensation intact    <2 sec cap refill    Durkan/extension x-rays test: Negative  Tinel's test: Negative  Phalen's test: Negative  Median Nerve: Decreased  Ulnar Nerve: Normal  Thenar Muscle atrophy: +,  Hypothenar Muscle atrophy: Negative    Positive Spurling test    IMAGING:   X-ray bilateral wrist: No acute osseous abnormalities, no degenerative changes noted  X-ray bilateral elbow: No acute osseous abnormalities, no degenerative changes noted  Cervical spine x-rays: No acute osseous abnormalities, degenerative changes or spondylolisthesis flexion/extension x-rays      ASSESSMENT AND PLAN:  38 y.o. female presenting with history, clinical exam and radiographs consistent with left-sided cervical radiculopathy, and bilateral carpal tunnel syndrome.  Treatment options were reviewed and discussed with the patient extensively and recommend obtaining bilateral upper extremity EMGs and bilateral ultrasounds of wrist.  Patient will follow-up after obtaining above-mentioned test to review results and talk about next steps in treatment.

## 2024-05-08 ENCOUNTER — HOSPITAL ENCOUNTER (OUTPATIENT)
Dept: ULTRASOUND IMAGING | Facility: HOSPITAL | Age: 39
Discharge: HOME | End: 2024-05-08
Payer: COMMERCIAL

## 2024-05-08 DIAGNOSIS — G56.03 BILATERAL CARPAL TUNNEL SYNDROME: ICD-10-CM

## 2024-05-08 PROCEDURE — 76882 US LMTD JT/FCL EVL NVASC XTR: CPT

## 2024-05-21 ENCOUNTER — HOSPITAL ENCOUNTER (OUTPATIENT)
Dept: NEUROLOGY | Facility: CLINIC | Age: 39
Discharge: HOME | End: 2024-05-21
Payer: COMMERCIAL

## 2024-05-21 DIAGNOSIS — M54.12 CERVICAL RADICULOPATHY: ICD-10-CM

## 2024-05-21 DIAGNOSIS — G56.03 BILATERAL CARPAL TUNNEL SYNDROME: ICD-10-CM

## 2024-05-21 DIAGNOSIS — M25.532 BILATERAL WRIST PAIN: ICD-10-CM

## 2024-05-21 DIAGNOSIS — M25.531 BILATERAL WRIST PAIN: ICD-10-CM

## 2024-05-21 PROBLEM — G56.20 ULNAR NERVE ENTRAPMENT AT ELBOW: Status: ACTIVE | Noted: 2024-05-21

## 2024-05-21 PROCEDURE — 95911 NRV CNDJ TEST 9-10 STUDIES: CPT | Performed by: PSYCHIATRY & NEUROLOGY

## 2024-05-21 PROCEDURE — 95886 MUSC TEST DONE W/N TEST COMP: CPT | Performed by: PSYCHIATRY & NEUROLOGY

## 2024-06-12 ENCOUNTER — OFFICE VISIT (OUTPATIENT)
Dept: OBGYN CLINIC | Facility: CLINIC | Age: 39
End: 2024-06-12
Payer: COMMERCIAL

## 2024-06-12 VITALS
WEIGHT: 128 LBS | HEART RATE: 73 BPM | DIASTOLIC BLOOD PRESSURE: 88 MMHG | HEIGHT: 63 IN | SYSTOLIC BLOOD PRESSURE: 135 MMHG | BODY MASS INDEX: 22.68 KG/M2

## 2024-06-12 DIAGNOSIS — G56.02 CARPAL TUNNEL SYNDROME ON LEFT: Primary | ICD-10-CM

## 2024-06-12 PROCEDURE — 99213 OFFICE O/P EST LOW 20 MIN: CPT | Performed by: ORTHOPAEDIC SURGERY

## 2024-06-12 PROCEDURE — 20526 THER INJECTION CARP TUNNEL: CPT | Performed by: ORTHOPAEDIC SURGERY

## 2024-06-12 RX ORDER — LIDOCAINE HYDROCHLORIDE 10 MG/ML
1 INJECTION, SOLUTION INFILTRATION; PERINEURAL
Status: COMPLETED | OUTPATIENT
Start: 2024-06-12 | End: 2024-06-12

## 2024-06-12 RX ORDER — BETAMETHASONE SODIUM PHOSPHATE AND BETAMETHASONE ACETATE 3; 3 MG/ML; MG/ML
6 INJECTION, SUSPENSION INTRA-ARTICULAR; INTRALESIONAL; INTRAMUSCULAR; SOFT TISSUE
Status: COMPLETED | OUTPATIENT
Start: 2024-06-12 | End: 2024-06-12

## 2024-06-12 RX ADMIN — LIDOCAINE HYDROCHLORIDE 1 ML: 10 INJECTION, SOLUTION INFILTRATION; PERINEURAL at 09:45

## 2024-06-12 RX ADMIN — BETAMETHASONE SODIUM PHOSPHATE AND BETAMETHASONE ACETATE 6 MG: 3; 3 INJECTION, SUSPENSION INTRA-ARTICULAR; INTRALESIONAL; INTRAMUSCULAR; SOFT TISSUE at 09:45

## 2024-06-12 NOTE — PROGRESS NOTES
[facility] [address] [id number] [phone]       Scott Lock  [id number]  1985    ORTHOPAEDIC SURGERY OUTPATIENT NOTE  2024      HISTORY:  39 y.o. female  presents for follow up for bilateral hand numbness. When we last saw her we recommended US and EMG to evaluate for carpal tunnel vs cervical radiculopathy. She also complains of left sided neck pain and pain shooting down the left arm. She has intermittent numbness in the hand, thumb and index finger. No injections for carpal tunnel.     Past Medical History:   Diagnosis Date    Abdominal pain     Abnormal MRI, cervical spine     last assessed: 2016    Acne     Back pain, chronic     Chronic pain disorder     Constipation     Fatigue     Hemorrhoids     resolved: 2017    Loss of weight     Migraines     Nausea with vomiting     PONV (postoperative nausea and vomiting)     Tear of left rotator cuff     last assessed: 2016       Past Surgical History:   Procedure Laterality Date     SECTION      x 2    COLONOSCOPY N/A 2016    Procedure: COLONOSCOPY;  Surgeon: Ed Gomez MD;  Location: [facility];  Service:     ENDOMETRIAL CRYOABLATION      HERNIA REPAIR      HYSTERECTOMY      KS NEUROPLASTY &/TRANSPOSITION ULNAR NERVE ELBOW Left 2018    Procedure: RELEASE CUBITAL TUNNEL;  Surgeon: Jerzy Kitchen MD;  Location:  MAIN OR;  Service: Orthopedics    [place] NEUROPLASTY &/TRANSPOSITION ULNAR NERVE ELBOW Right 2018    Procedure: RELEASE CUBITAL TUNNEL;  Surgeon: Jerzy Kitchen MD;  Location:  MAIN OR;  Service: Orthopedics    SHOULDER SURGERY Left     TUBAL LIGATION         Social History     Socioeconomic History    Marital status: /Civil Union     Spouse name: Not on file    Number of children: 3    Years of education: Not on file    Highest education level: Not on file   Occupational History     Occupation: employed   Tobacco Use    Smoking status: Every Day     Types: Cigarettes    Smokeless tobacco: Never    Tobacco comments:     Has cut down to 4 daily   Vaping Use    Vaping status: Never Used   Substance and Sexual Activity    Alcohol use: No    Drug use: No    Sexual activity: Yes   Other Topics Concern    Not on file   Social History Narrative    2 children (as per allcripts)     Social Determinants of Health     Financial Resource Strain: Not on file   Food Insecurity: Not on file   Transportation Needs: Not on file   Physical Activity: Not on file   Stress: Not on file   Social Connections: Not on file   Intimate Partner Violence: Not on file   Housing Stability: Not on file       Family History   Problem Relation Age of Onset    Ovarian cancer Mother 40    Cancer Father         throat cancer    Alcohol abuse Father         alcoholism    Cirrhosis Father         hepatic    Throat cancer Father     Diabetes Maternal Grandfather         melliltus    No Known Problems Sister     No Known Problems Daughter     Ovarian cancer Maternal Grandmother     Melanoma Maternal Grandmother     Cervical cancer Maternal Grandmother     Colon cancer Maternal Grandmother     No Known Problems Paternal Grandmother     No Known Problems Paternal Grandfather     No Known Problems Son     No Known Problems Sister     No Known Problems Sister     No Known Problems Sister     No Known Problems Brother     No Known Problems Maternal Aunt     Breast cancer Maternal Aunt 38    No Known Problems Maternal Aunt     No Known Problems Maternal Uncle     No Known Problems Paternal Uncle     Breast cancer Other 50        Patient's Medications   New Prescriptions    No medications on file   Previous Medications    CLINDAMYCIN PHOS-BENZOYL PEROX GEL    Apply 1 application topically 2 (two) times a day    DOXYCYCLINE (ADOXA) 50 MG TABLET    Take 50 mg by mouth 2 (two) times a day    HYDROXYCHLOROQUINE (PLAQUENIL) 200 MG TABLET    Take  "once daily with full glass of water    LEUPROLIDE (LUPRON DEPOT) 3.75 MG INJECTION    Inject 3.75 mg into a muscle every 28 days    NAPROXEN SODIUM (ANAPROX) 550 MG TABLET    Take 1 tablet (550 mg total) by mouth 2 (two) times a day with meals for 30 doses    ORILISSA 200 MG TABS    TAKE 1 TABLET BY MOUTH TWICE A DAY    SPIRONOLACTONE (ALDACTONE) 100 MG TABLET    Take 1 tablet (100 mg total) by mouth daily With full glass of water   Modified Medications    No medications on file   Discontinued Medications    No medications on file       No Known Allergies     /88 (BP Location: Left arm, Patient Position: Sitting, Cuff Size: Standard)   Pulse 73   Ht 5' 3\" (1.6 m)   Wt 58.1 kg (128 lb)   BMI 22.67 kg/m²      REVIEW OF SYSTEMS:  Constitutional: Negative.    HEENT: Negative.    Respiratory: Negative.    Skin: Negative.    Neurological: Negative.    Psychiatric/Behavioral: Negative.  Musculoskeletal: Negative except for that mentioned in the HPI.    /88 (BP Location: Left arm, Patient Position: Sitting, Cuff Size: Standard)   Pulse 73   Ht 5' 3\" (1.6 m)   Wt 58.1 kg (128 lb)   BMI 22.67 kg/m²   Gen: No acute distress, resting comfortably in bed  HEENT: Eyes clear, moist mucus membranes, hearing intact  Respiratory: No audible wheezing or stridor  Cardiovascular: Well Perfused peripherally, 2+ distal pulse  Abdomen: nondistended, no peritoneal signs     PHYSICAL EXAM:    Left hand:  Negative Tinel  Negative Durkan's  Mildly positive phalens   strength intact  Median nerve sensation decreased        IMAGING:  US of the bilateral wrists demonstrates   Elevated WFR on the right 2.1, left increased cross sectional area of median nerver 13 mmsq.      ASSESSMENT AND PLAN:  39 y.o. female with left carpal tunnel syndrome.  Her ultrasound is mildly positive but she does have numbness in the hand.  We discussed treatment options to include injection to the left carpal tunnel.  She elected to proceed and " "this was tolerated well.  Will we will see her back as needed.    Hand/upper extremity injection: L carpal tunnel  [place] Protocol:  Risks and benefits: risks, benefits and alternatives were discussed  Consent given by: patient  Time out: Immediately prior to procedure a \"time out\" was called to verify the correct patient, procedure, equipment, support staff and site/side marked as required.  Supporting Documentation  Indications: pain and therapeutic   Procedure Details  Condition:carpal tunnel syndrome Site: L carpal tunnel   Needle size: 25 G  Ultrasound guidance: no  Approach: volar  Medications administered: 1 mL lidocaine 1 %; 6 mg betamethasone acetate-betamethasone sodium phosphate 6 (3-3) mg/mL  Patient tolerance: patient tolerated the procedure well with no immediate complications  Dressing:  Sterile dressing applied           "

## 2024-10-28 ENCOUNTER — OFFICE VISIT (OUTPATIENT)
Dept: OBGYN CLINIC | Facility: CLINIC | Age: 39
End: 2024-10-28
Payer: COMMERCIAL

## 2024-10-28 VITALS
WEIGHT: 128 LBS | HEART RATE: 77 BPM | BODY MASS INDEX: 22.68 KG/M2 | HEIGHT: 63 IN | DIASTOLIC BLOOD PRESSURE: 72 MMHG | SYSTOLIC BLOOD PRESSURE: 133 MMHG

## 2024-10-28 DIAGNOSIS — M25.512 LEFT SHOULDER PAIN, UNSPECIFIED CHRONICITY: ICD-10-CM

## 2024-10-28 DIAGNOSIS — M50.30 DEGENERATIVE CERVICAL DISC: Primary | ICD-10-CM

## 2024-10-28 DIAGNOSIS — G56.02 CARPAL TUNNEL SYNDROME ON LEFT: ICD-10-CM

## 2024-10-28 DIAGNOSIS — M54.2 NECK PAIN: ICD-10-CM

## 2024-10-28 PROCEDURE — 20526 THER INJECTION CARP TUNNEL: CPT | Performed by: ORTHOPAEDIC SURGERY

## 2024-10-28 PROCEDURE — 20610 DRAIN/INJ JOINT/BURSA W/O US: CPT | Performed by: ORTHOPAEDIC SURGERY

## 2024-10-28 PROCEDURE — 99214 OFFICE O/P EST MOD 30 MIN: CPT | Performed by: ORTHOPAEDIC SURGERY

## 2024-10-28 RX ORDER — TRIAMCINOLONE ACETONIDE 40 MG/ML
40 INJECTION, SUSPENSION INTRA-ARTICULAR; INTRAMUSCULAR
Status: COMPLETED | OUTPATIENT
Start: 2024-10-28 | End: 2024-10-28

## 2024-10-28 RX ORDER — DEXAMETHASONE SODIUM PHOSPHATE 10 MG/ML
40 INJECTION, SOLUTION INTRAMUSCULAR; INTRAVENOUS
Status: COMPLETED | OUTPATIENT
Start: 2024-10-28 | End: 2024-10-28

## 2024-10-28 RX ORDER — LIDOCAINE HYDROCHLORIDE 10 MG/ML
1 INJECTION, SOLUTION INFILTRATION; PERINEURAL
Status: COMPLETED | OUTPATIENT
Start: 2024-10-28 | End: 2024-10-28

## 2024-10-28 RX ORDER — LIDOCAINE HYDROCHLORIDE 10 MG/ML
4 INJECTION, SOLUTION INFILTRATION; PERINEURAL
Status: COMPLETED | OUTPATIENT
Start: 2024-10-28 | End: 2024-10-28

## 2024-10-28 RX ADMIN — LIDOCAINE HYDROCHLORIDE 1 ML: 10 INJECTION, SOLUTION INFILTRATION; PERINEURAL at 14:45

## 2024-10-28 RX ADMIN — DEXAMETHASONE SODIUM PHOSPHATE 40 MG: 10 INJECTION, SOLUTION INTRAMUSCULAR; INTRAVENOUS at 14:45

## 2024-10-28 RX ADMIN — LIDOCAINE HYDROCHLORIDE 4 ML: 10 INJECTION, SOLUTION INFILTRATION; PERINEURAL at 14:45

## 2024-10-28 RX ADMIN — TRIAMCINOLONE ACETONIDE 40 MG: 40 INJECTION, SUSPENSION INTRA-ARTICULAR; INTRAMUSCULAR at 14:45

## 2024-10-28 NOTE — PROGRESS NOTES
[facility]TH[facility]ARE [facility] [address] [id number] [phone]       Scott Lock  [id number]  1985    ORTHOPAEDIC SURGERY OUTPATIENT NOTE  10/28/2024      HISTORY:  39 y.o. female presents today follow-up for left carpal tunnel syndrome.  Patient had a left carpal tunnel steroid injection on 2024 and had relief for 2 to 3 months.  Patient states she is having numbness and tingling in the left thumb and ring finger and small finger.  Also having pain in the left side of her neck rating down to her shoulder blade and down to the triceps region. Pain is worse with lifting and use of the arm.     Past Medical History:   Diagnosis Date    Abdominal pain     Abnormal MRI, cervical spine     last assessed: 2016    Acne     Back pain, chronic     Chronic pain disorder     Constipation     Fatigue     Hemorrhoids     resolved: 2017    Loss of weight     Migraines     Nausea with vomiting     PONV (postoperative nausea and vomiting)     Tear of left rotator cuff     last assessed: 2016       Past Surgical History:   Procedure Laterality Date     SECTION      x 2    COLONOSCOPY N/A 2016    Procedure: COLONOSCOPY;  Surgeon: Ed Gomez MD;  Location: [facility];  Service:     ENDOMETRIAL CRYOABLATION      HERNIA REPAIR      HYSTERECTOMY      [place] NEUROPLASTY &/TRANSPOSITION ULNAR NERVE ELBOW Left 2018    Procedure: RELEASE CUBITAL TUNNEL;  Surgeon: Jerzy Kitchen MD;  Location:  MAIN OR;  Service: Orthopedics    [place] NEUROPLASTY &/TRANSPOSITION ULNAR NERVE ELBOW Right 2018    Procedure: RELEASE CUBITAL TUNNEL;  Surgeon: Jerzy Kitchen MD;  Location:  MAIN OR;  Service: Orthopedics    SHOULDER SURGERY Left     TUBAL LIGATION         Social History     Socioeconomic History    Marital status: /Civil Union     Spouse name: Not on file    Number of children: 3    Years of education:  Not on file    Highest education level: Not on file   Occupational History    Occupation: employed   Tobacco Use    Smoking status: Every Day     Types: Cigarettes    Smokeless tobacco: Never    Tobacco comments:     Has cut down to 4 daily   Vaping Use    Vaping status: Never Used   Substance and Sexual Activity    Alcohol use: No    Drug use: No    Sexual activity: Yes   Other Topics Concern    Not on file   Social History Narrative    2 children (as per allcripts)     Social Determinants of Health     Financial Resource Strain: Not on file   Food Insecurity: Not on file   Transportation Needs: Not on file   Physical Activity: Not on file   Stress: Not on file   Social Connections: Not on file   Intimate Partner Violence: Not on file   Housing Stability: Not on file       Family History   Problem Relation Age of Onset    Ovarian cancer Mother 40    Cancer Father         throat cancer    Alcohol abuse Father         alcoholism    Cirrhosis Father         hepatic    Throat cancer Father     Diabetes Maternal Grandfather         melliltus    No Known Problems Sister     No Known Problems Daughter     Ovarian cancer Maternal Grandmother     Melanoma Maternal Grandmother     Cervical cancer Maternal Grandmother     Colon cancer Maternal Grandmother     No Known Problems Paternal Grandmother     No Known Problems Paternal Grandfather     No Known Problems Son     No Known Problems Sister     No Known Problems Sister     No Known Problems Sister     No Known Problems Brother     No Known Problems Maternal Aunt     Breast cancer Maternal Aunt 38    No Known Problems Maternal Aunt     No Known Problems Maternal Uncle     No Known Problems Paternal Uncle     Breast cancer Other 50        Patient's Medications   New Prescriptions    No medications on file   Previous Medications    CLINDAMYCIN PHOS-BENZOYL PEROX GEL    Apply 1 application topically 2 (two) times a day    DOXYCYCLINE (ADOXA) 50 MG TABLET    Take 50 mg by mouth  "2 (two) times a day    HYDROXYCHLOROQUINE (PLAQUENIL) 200 MG TABLET    Take once daily with full glass of water    LEUPROLIDE (LUPRON DEPOT) 3.75 MG INJECTION    Inject 3.75 mg into a muscle every 28 days    NAPROXEN SODIUM (ANAPROX) 550 MG TABLET    Take 1 tablet (550 mg total) by mouth 2 (two) times a day with meals for 30 doses    ORILISSA 200 MG TABS    TAKE 1 TABLET BY MOUTH TWICE A DAY    SPIRONOLACTONE (ALDACTONE) 100 MG TABLET    Take 1 tablet (100 mg total) by mouth daily With full glass of water   Modified Medications    No medications on file   Discontinued Medications    No medications on file       No Known Allergies     /72 (BP Location: Left arm, Patient Position: Sitting, Cuff Size: Standard)   Pulse 77   Ht 5' 3\" (1.6 m)   Wt 58.1 kg (128 lb)   BMI 22.67 kg/m²      REVIEW OF SYSTEMS:  Constitutional: Negative.    HEENT: Negative.    Respiratory: Negative.    Skin: Negative.    Neurological: Negative.    Psychiatric/Behavioral: Negative.  Musculoskeletal: Negative except for that mentioned in the HPI.    Gen: No acute distress, resting comfortably in bed  HEENT: Eyes clear, moist mucus membranes, hearing intact  Respiratory: No audible wheezing or stridor  Cardiovascular: Well Perfused peripherally, 2+ distal pulse  Abdomen: nondistended, no peritoneal signs     PHYSICAL EXAM:    Left hand:  Negative Tinel  Negative Durkan's  Mildly positive phalens   strength intact  Median nerve sensation decreased    IMAGING:  XR cervical spine demonstrates loss or lordosis Degenerative changes C2-C6     ASSESSMENT AND PLAN:  39 y.o. female  Left carpal tunnel syndrome , left shoulder pain and cervical degenerative changes     XR Cervical spine was reviewed in the office today. Patient received a left carpal tunnel and subacromial bursa steroid injection. A referral was placed for Dr. CASAS for pain management. She is to follow up PRN       Large joint arthrocentesis: L subacromial bursa  [facility] " "Protocol:  procedure performed by consultantConsent: Verbal consent obtained.  Risks and benefits: risks, benefits and alternatives were discussed  Consent given by: patient  Time out: Immediately prior to procedure a \"time out\" was called to verify the correct patient, procedure, equipment, support staff and site/side marked as required.  Timeout called at: 10/28/2024 3:03 PM.  Patient understanding: patient states understanding of the procedure being performed  Site marked: the operative site was marked  Supporting Documentation  Indications: pain   Procedure Details  Location: shoulder - L subacromial bursa  Needle size: 22 G  Approach: posterior  Medications administered: 4 mL lidocaine 1 %; 40 mg triamcinolone acetonide 40 mg/mL    Patient tolerance: patient tolerated the procedure well with no immediate complications  Dressing:  Sterile dressing applied      Hand/upper extremity injection: L carpal tunnel  [place] Protocol:  procedure performed by consultantConsent: Verbal consent not obtained.  Risks and benefits: risks, benefits and alternatives were discussed  Consent given by: patient  Time out: Immediately prior to procedure a \"time out\" was called to verify the correct patient, procedure, equipment, support staff and site/side marked as required.  Timeout called at: 10/28/2024 3:03 PM.  Patient understanding: patient states understanding of the procedure being performed  Site marked: the operative site was marked  Supporting Documentation  Indications: pain   Procedure Details  Condition:carpal tunnel syndrome Site: L carpal tunnel   Needle size: 25 G  Medications administered: 1 mL lidocaine 1 %; 40 mg dexamethasone 100 mg/10 mL  Patient tolerance: patient tolerated the procedure well with no immediate complications  Dressing:  Sterile dressing applied                    Scribe Attestation      I,:  Sumanth Ventura MA am acting as a scribe while in the presence of the attending physician.:     "   I,:  Pamela Larson DO personally performed the services described in this documentation    as scribed in my presence.:

## 2025-05-01 ENCOUNTER — TELEPHONE (OUTPATIENT)
Age: 40
End: 2025-05-01

## 2025-05-01 NOTE — TELEPHONE ENCOUNTER
Patient requesting screening bilateral mammogram script for her 5/7/25 appointment at [facility], [address], [address], [place] [address].

## 2025-05-02 ENCOUNTER — TELEPHONE (OUTPATIENT)
Age: 40
End: 2025-05-02

## 2025-05-02 DIAGNOSIS — Z12.31 ENCOUNTER FOR SCREENING MAMMOGRAM FOR BREAST CANCER: Primary | ICD-10-CM

## 2025-05-07 ENCOUNTER — HOSPITAL ENCOUNTER (OUTPATIENT)
Dept: MAMMOGRAPHY | Facility: MEDICAL CENTER | Age: 40
Discharge: HOME | End: 2025-05-07
Attending: FAMILY MEDICINE
Payer: COMMERCIAL

## 2025-05-07 VITALS — BODY MASS INDEX: 27.6 KG/M2 | HEIGHT: 62 IN | WEIGHT: 150 LBS

## 2025-05-07 DIAGNOSIS — Z12.31 ENCOUNTER FOR SCREENING MAMMOGRAM FOR MALIGNANT NEOPLASM OF BREAST: ICD-10-CM

## 2025-05-07 PROCEDURE — 77067 SCR MAMMO BI INCL CAD: CPT

## 2025-05-07 PROCEDURE — 77063 BREAST TOMOSYNTHESIS BI: CPT

## (undated) DEVICE — ARM SLING: Brand: DEROYAL

## (undated) DEVICE — STERILE BETHLEHEM PLASTIC HAND: Brand: CARDINAL HEALTH

## (undated) DEVICE — SPONGE PVP SCRUB WING STERILE

## (undated) DEVICE — GLOVE SRG BIOGEL 7.5

## (undated) DEVICE — GLOVE SRG BIOGEL 7

## (undated) DEVICE — GLOVE INDICATOR PI UNDERGLOVE SZ 7 BLUE

## (undated) DEVICE — INTENDED FOR TISSUE SEPARATION, AND OTHER PROCEDURES THAT REQUIRE A SHARP SURGICAL BLADE TO PUNCTURE OR CUT.: Brand: BARD-PARKER SAFETY BLADES SIZE 15, STERILE

## (undated) DEVICE — ACE WRAP 4 IN UNSTERILE

## (undated) DEVICE — INTENDED FOR TISSUE SEPARATION, AND OTHER PROCEDURES THAT REQUIRE A SHARP SURGICAL BLADE TO PUNCTURE OR CUT.: Brand: BARD-PARKER SAFETY BLADES SIZE 11, STERILE

## (undated) DEVICE — GLOVE INDICATOR PI UNDERGLOVE SZ 8 BLUE

## (undated) DEVICE — CUFF TOURNIQUET 18 X 4 IN QUICK CONNECT DISP 1 BLADDER

## (undated) DEVICE — ANTIBACTERIAL VIOLET BRAIDED (POLYGLACTIN 910), SYNTHETIC ABSORBABLE SURGICAL SUTURE: Brand: COATED VICRYL

## (undated) DEVICE — CAST PADDING 4 IN STERILE

## (undated) DEVICE — SUT VICRYL 3-0 SH 27 IN J416H

## (undated) DEVICE — GLOVE INDICATOR PI UNDERGLOVE SZ 6.5 BLUE

## (undated) DEVICE — CHLORAPREP HI-LITE 26ML ORANGE

## (undated) DEVICE — GLOVE SRG BIOGEL 6.5

## (undated) DEVICE — SPLINT 5 X 30 IN XFAST SET PLASTER

## (undated) DEVICE — CUFF TOURNIQUET 18 X 5.5 IN QUICK CONNECT 2BLA

## (undated) DEVICE — DISPOSABLE EQUIPMENT COVER: Brand: SMALL TOWEL DRAPE

## (undated) DEVICE — NEEDLE 25G X 1 1/2

## (undated) DEVICE — SUT PROLENE 4-0 PC-3 18 IN 8634G